# Patient Record
Sex: MALE | Race: BLACK OR AFRICAN AMERICAN | Employment: OTHER | URBAN - METROPOLITAN AREA
[De-identification: names, ages, dates, MRNs, and addresses within clinical notes are randomized per-mention and may not be internally consistent; named-entity substitution may affect disease eponyms.]

---

## 2023-06-07 LAB
ALBUMIN (G/DL) IN SER/PLAS: 4.3 G/DL (ref 3.4–5)
ALBUMIN (MG/L) IN URINE: 51.4 MG/L
ALBUMIN/CREATININE (UG/MG) IN URINE: 40.5 UG/MG CRT (ref 0–30)
ANION GAP IN SER/PLAS: 13 MMOL/L (ref 10–20)
CALCIUM (MG/DL) IN SER/PLAS: 9 MG/DL (ref 8.6–10.6)
CARBON DIOXIDE, TOTAL (MMOL/L) IN SER/PLAS: 28 MMOL/L (ref 21–32)
CHLORIDE (MMOL/L) IN SER/PLAS: 105 MMOL/L (ref 98–107)
CREATININE (MG/DL) IN SER/PLAS: 1.35 MG/DL (ref 0.5–1.3)
CREATININE (MG/DL) IN URINE: 127 MG/DL (ref 20–370)
ESTIMATED AVERAGE GLUCOSE FOR HBA1C: 160 MG/DL
GFR MALE: 55 ML/MIN/1.73M2
GLUCOSE (MG/DL) IN SER/PLAS: 176 MG/DL (ref 74–99)
HEMOGLOBIN A1C/HEMOGLOBIN TOTAL IN BLOOD: 7.2 %
PHOSPHATE (MG/DL) IN SER/PLAS: 4.1 MG/DL (ref 2.5–4.9)
POTASSIUM (MMOL/L) IN SER/PLAS: 4.3 MMOL/L (ref 3.5–5.3)
SODIUM (MMOL/L) IN SER/PLAS: 142 MMOL/L (ref 136–145)
UREA NITROGEN (MG/DL) IN SER/PLAS: 18 MG/DL (ref 6–23)

## 2023-09-17 PROBLEM — H93.13 BILATERAL TINNITUS: Status: ACTIVE | Noted: 2021-10-26

## 2023-09-17 PROBLEM — E11.9 DIABETES (MULTI): Status: ACTIVE | Noted: 2023-09-17

## 2023-09-17 PROBLEM — E78.5 DYSLIPIDEMIA: Status: ACTIVE | Noted: 2023-09-17

## 2023-09-17 PROBLEM — H90.3 SENSORINEURAL HEARING LOSS, BILATERAL: Status: ACTIVE | Noted: 2021-10-26

## 2023-09-17 PROBLEM — I10 HYPERTENSION: Status: ACTIVE | Noted: 2023-09-17

## 2023-09-17 PROBLEM — E11.40 DIABETIC NEUROPATHY (MULTI): Status: ACTIVE | Noted: 2023-09-17

## 2023-09-17 PROBLEM — E55.9 VITAMIN D DEFICIENCY: Status: ACTIVE | Noted: 2023-09-17

## 2023-09-17 RX ORDER — GLIPIZIDE 10 MG/1
10 TABLET, FILM COATED, EXTENDED RELEASE ORAL 2 TIMES DAILY
COMMUNITY
End: 2023-10-10 | Stop reason: ALTCHOICE

## 2023-09-17 RX ORDER — METFORMIN HYDROCHLORIDE 750 MG/1
1 TABLET, EXTENDED RELEASE ORAL
COMMUNITY

## 2023-09-17 RX ORDER — LOSARTAN POTASSIUM 50 MG/1
1 TABLET ORAL DAILY
COMMUNITY
End: 2023-10-10 | Stop reason: ALTCHOICE

## 2023-09-17 RX ORDER — CLOPIDOGREL BISULFATE 75 MG/1
1 TABLET ORAL DAILY
COMMUNITY
End: 2023-10-10 | Stop reason: ALTCHOICE

## 2023-09-17 RX ORDER — ASPIRIN 81 MG/1
1 TABLET ORAL DAILY
COMMUNITY
End: 2023-10-10 | Stop reason: ALTCHOICE

## 2023-09-17 RX ORDER — LOSARTAN POTASSIUM 25 MG/1
1 TABLET ORAL DAILY
COMMUNITY
End: 2023-10-10 | Stop reason: ALTCHOICE

## 2023-09-17 RX ORDER — ATORVASTATIN CALCIUM 20 MG/1
1 TABLET, FILM COATED ORAL NIGHTLY
COMMUNITY

## 2023-09-17 RX ORDER — AMLODIPINE BESYLATE 5 MG/1
TABLET ORAL
COMMUNITY

## 2023-10-10 ENCOUNTER — LAB (OUTPATIENT)
Dept: LAB | Facility: LAB | Age: 73
End: 2023-10-10
Payer: MEDICARE

## 2023-10-10 ENCOUNTER — OFFICE VISIT (OUTPATIENT)
Dept: PRIMARY CARE | Facility: CLINIC | Age: 73
End: 2023-10-10
Payer: MEDICARE

## 2023-10-10 VITALS
SYSTOLIC BLOOD PRESSURE: 140 MMHG | BODY MASS INDEX: 27.17 KG/M2 | WEIGHT: 205.03 LBS | RESPIRATION RATE: 16 BRPM | OXYGEN SATURATION: 97 % | HEIGHT: 73 IN | HEART RATE: 86 BPM | DIASTOLIC BLOOD PRESSURE: 80 MMHG

## 2023-10-10 DIAGNOSIS — Z00.00 HEALTHCARE MAINTENANCE: ICD-10-CM

## 2023-10-10 DIAGNOSIS — R97.20 ELEVATED PSA: ICD-10-CM

## 2023-10-10 DIAGNOSIS — I10 ESSENTIAL HYPERTENSION: ICD-10-CM

## 2023-10-10 DIAGNOSIS — L30.9 ECZEMA, UNSPECIFIED TYPE: ICD-10-CM

## 2023-10-10 DIAGNOSIS — R97.20 ELEVATED PSA: Primary | ICD-10-CM

## 2023-10-10 DIAGNOSIS — E11.9 TYPE 2 DIABETES MELLITUS WITHOUT COMPLICATION, WITHOUT LONG-TERM CURRENT USE OF INSULIN (MULTI): ICD-10-CM

## 2023-10-10 DIAGNOSIS — E78.5 HYPERLIPIDEMIA, UNSPECIFIED HYPERLIPIDEMIA TYPE: ICD-10-CM

## 2023-10-10 PROBLEM — H25.13 NUCLEAR SENILE CATARACT OF BOTH EYES: Status: ACTIVE | Noted: 2019-02-05

## 2023-10-10 PROBLEM — H61.23 BILATERAL IMPACTED CERUMEN: Status: ACTIVE | Noted: 2021-10-26

## 2023-10-10 LAB
ALBUMIN SERPL BCP-MCNC: 4.2 G/DL (ref 3.4–5)
ANION GAP SERPL CALC-SCNC: 15 MMOL/L (ref 10–20)
BUN SERPL-MCNC: 17 MG/DL (ref 6–23)
CALCIUM SERPL-MCNC: 9.6 MG/DL (ref 8.6–10.6)
CHLORIDE SERPL-SCNC: 105 MMOL/L (ref 98–107)
CO2 SERPL-SCNC: 27 MMOL/L (ref 21–32)
CREAT SERPL-MCNC: 1.64 MG/DL (ref 0.5–1.3)
GFR SERPL CREATININE-BSD FRML MDRD: 43 ML/MIN/1.73M*2
GLUCOSE SERPL-MCNC: 179 MG/DL (ref 74–99)
PHOSPHATE SERPL-MCNC: 4.3 MG/DL (ref 2.5–4.9)
POTASSIUM SERPL-SCNC: 4.5 MMOL/L (ref 3.5–5.3)
PSA SERPL-MCNC: 4.4 NG/ML
SODIUM SERPL-SCNC: 142 MMOL/L (ref 136–145)

## 2023-10-10 PROCEDURE — 1159F MED LIST DOCD IN RCRD: CPT | Performed by: INTERNAL MEDICINE

## 2023-10-10 PROCEDURE — 1036F TOBACCO NON-USER: CPT | Performed by: INTERNAL MEDICINE

## 2023-10-10 PROCEDURE — G0008 ADMIN INFLUENZA VIRUS VAC: HCPCS | Performed by: INTERNAL MEDICINE

## 2023-10-10 PROCEDURE — 3079F DIAST BP 80-89 MM HG: CPT | Performed by: INTERNAL MEDICINE

## 2023-10-10 PROCEDURE — 3077F SYST BP >= 140 MM HG: CPT | Performed by: INTERNAL MEDICINE

## 2023-10-10 PROCEDURE — 84153 ASSAY OF PSA TOTAL: CPT

## 2023-10-10 PROCEDURE — 3051F HG A1C>EQUAL 7.0%<8.0%: CPT | Performed by: INTERNAL MEDICINE

## 2023-10-10 PROCEDURE — 1160F RVW MEDS BY RX/DR IN RCRD: CPT | Performed by: INTERNAL MEDICINE

## 2023-10-10 PROCEDURE — 80069 RENAL FUNCTION PANEL: CPT

## 2023-10-10 PROCEDURE — 36415 COLL VENOUS BLD VENIPUNCTURE: CPT

## 2023-10-10 PROCEDURE — 99214 OFFICE O/P EST MOD 30 MIN: CPT | Performed by: INTERNAL MEDICINE

## 2023-10-10 PROCEDURE — 90662 IIV NO PRSV INCREASED AG IM: CPT | Performed by: INTERNAL MEDICINE

## 2023-10-10 RX ORDER — TRIAMCINOLONE ACETONIDE 1 MG/G
CREAM TOPICAL 2 TIMES DAILY
Qty: 15 G | Refills: 0 | Status: SHIPPED | OUTPATIENT
Start: 2023-10-10 | End: 2023-10-12 | Stop reason: SDUPTHER

## 2023-10-10 RX ORDER — LOSARTAN POTASSIUM AND HYDROCHLOROTHIAZIDE 12.5; 5 MG/1; MG/1
1 TABLET ORAL DAILY
COMMUNITY

## 2023-10-10 RX ORDER — DAPAGLIFLOZIN 5 MG/1
5 TABLET, FILM COATED ORAL DAILY
Qty: 30 TABLET | Refills: 11 | Status: SHIPPED | OUTPATIENT
Start: 2023-10-10 | End: 2023-10-12 | Stop reason: SDUPTHER

## 2023-10-10 RX ORDER — GLIPIZIDE 10 MG/1
10 TABLET, FILM COATED, EXTENDED RELEASE ORAL EVERY MORNING
COMMUNITY
End: 2023-10-10 | Stop reason: ALTCHOICE

## 2023-10-10 RX ORDER — GLIPIZIDE 5 MG/1
5 TABLET, FILM COATED, EXTENDED RELEASE ORAL NIGHTLY
COMMUNITY
End: 2023-10-10 | Stop reason: ALTCHOICE

## 2023-10-10 ASSESSMENT — ENCOUNTER SYMPTOMS
DEPRESSION: 0
LOSS OF SENSATION IN FEET: 0
OCCASIONAL FEELINGS OF UNSTEADINESS: 0

## 2023-10-10 NOTE — PATIENT INSTRUCTIONS
It was great seeing you in clinic today!  -We are going to start a new medicine for your diabetes, which will help both with your diabetes and help protect your kidneys. This medicine is called Dapagliflozin (Farxiga). You should STOP taking glipizide when you start this new medicine.  -We have sent a topical steroid cream that you should apply to your eczema on your legs. Make sure you apply vaseline and lotion to your legs especially after you shower.  -Please go to your lab on your way out so we can check labs today.    Return to clinic in 3-4 months for a follow up

## 2023-10-10 NOTE — PROGRESS NOTES
Follow Up Visit     Mr. Pham is a 72 y/o M here for follow up visit with regards to his diabetes and microalbuminuria. We tried to start Jardiance at his last visit but it was not covered.  He also has a pruritic rash on his right leg, no drainage nontender.  No new body products.    Physical Exam  Vitals reviewed.   Constitutional:       Appearance: Normal appearance.   HENT:      Head: Normocephalic and atraumatic.      Nose: Nose normal.      Mouth/Throat:      Mouth: Mucous membranes are moist.      Pharynx: Oropharynx is clear.   Eyes:      Extraocular Movements: Extraocular movements intact.      Conjunctiva/sclera: Conjunctivae normal.   Cardiovascular:      Rate and Rhythm: Normal rate and regular rhythm.      Pulses: Normal pulses.      Heart sounds: Normal heart sounds.   Pulmonary:      Effort: Pulmonary effort is normal.      Breath sounds: Normal breath sounds.   Abdominal:      General: Abdomen is flat.      Palpations: Abdomen is soft.   Musculoskeletal:         General: Normal range of motion.   Skin:     General: Skin is warm and dry.      Comments: Scaly discoid plaques with small papules diffusely across shins. R>L. Diffuse dryness of legs    Neurological:      General: No focal deficit present.      Mental Status: He is alert.   Psychiatric:         Mood and Affect: Mood normal.         Behavior: Behavior normal.         Thought Content: Thought content normal.        Assessment plan:  Mr. Pham is a very pleasant 73-year-old male here for annual wellness and physical.    Diabetes with diabetic neuropathy: A1c 7.2% in June 2023, on glipizide metformin, change glipizide to Farxiga.      Hypertension: On amlodipine, losartan-HCTZ.. Overall has a healthy lifestyle. Check renal function.      CKD stage II: with proteinuria.  Start Farxiga 5 mg.    Dyslipidemia: On atorvastatin 20, LDL 88.      RTC 3- 4 months    Staffed with Dr. Obdulio Marino MD   Internal Medicine-Pediatrics,  PGY4    Attending note:    I reviewed resident's note including history, exam, assessment and plan.  I personally obtained key parts of history and examined the patient,  I agree with resident's assessment and plan as stated above.

## 2023-10-12 DIAGNOSIS — E11.9 TYPE 2 DIABETES MELLITUS WITHOUT COMPLICATION, WITHOUT LONG-TERM CURRENT USE OF INSULIN (MULTI): ICD-10-CM

## 2023-10-12 DIAGNOSIS — L30.9 ECZEMA, UNSPECIFIED TYPE: ICD-10-CM

## 2023-10-13 RX ORDER — DAPAGLIFLOZIN 5 MG/1
5 TABLET, FILM COATED ORAL DAILY
Qty: 30 TABLET | Refills: 11 | Status: SHIPPED | OUTPATIENT
Start: 2023-10-13 | End: 2024-05-16 | Stop reason: SINTOL

## 2023-10-13 RX ORDER — TRIAMCINOLONE ACETONIDE 1 MG/G
CREAM TOPICAL 2 TIMES DAILY
Qty: 15 G | Refills: 0 | Status: SHIPPED | OUTPATIENT
Start: 2023-10-13

## 2023-10-18 ENCOUNTER — APPOINTMENT (OUTPATIENT)
Dept: PRIMARY CARE | Facility: CLINIC | Age: 73
End: 2023-10-18
Payer: MEDICARE

## 2023-11-03 ENCOUNTER — OFFICE VISIT (OUTPATIENT)
Dept: PRIMARY CARE | Facility: CLINIC | Age: 73
End: 2023-11-03
Payer: MEDICARE

## 2023-11-03 VITALS
HEIGHT: 73 IN | OXYGEN SATURATION: 96 % | WEIGHT: 198.19 LBS | RESPIRATION RATE: 16 BRPM | BODY MASS INDEX: 26.27 KG/M2 | DIASTOLIC BLOOD PRESSURE: 76 MMHG | SYSTOLIC BLOOD PRESSURE: 130 MMHG | HEART RATE: 65 BPM

## 2023-11-03 DIAGNOSIS — E78.5 HYPERLIPIDEMIA, UNSPECIFIED HYPERLIPIDEMIA TYPE: ICD-10-CM

## 2023-11-03 DIAGNOSIS — L30.9 ECZEMA, UNSPECIFIED TYPE: Primary | ICD-10-CM

## 2023-11-03 DIAGNOSIS — E11.9 TYPE 2 DIABETES MELLITUS WITHOUT COMPLICATION, WITHOUT LONG-TERM CURRENT USE OF INSULIN (MULTI): ICD-10-CM

## 2023-11-03 PROCEDURE — 3051F HG A1C>EQUAL 7.0%<8.0%: CPT | Performed by: INTERNAL MEDICINE

## 2023-11-03 PROCEDURE — 3078F DIAST BP <80 MM HG: CPT | Performed by: INTERNAL MEDICINE

## 2023-11-03 PROCEDURE — 1160F RVW MEDS BY RX/DR IN RCRD: CPT | Performed by: INTERNAL MEDICINE

## 2023-11-03 PROCEDURE — 99214 OFFICE O/P EST MOD 30 MIN: CPT | Performed by: INTERNAL MEDICINE

## 2023-11-03 PROCEDURE — 1036F TOBACCO NON-USER: CPT | Performed by: INTERNAL MEDICINE

## 2023-11-03 PROCEDURE — 3075F SYST BP GE 130 - 139MM HG: CPT | Performed by: INTERNAL MEDICINE

## 2023-11-03 PROCEDURE — 1159F MED LIST DOCD IN RCRD: CPT | Performed by: INTERNAL MEDICINE

## 2023-11-03 RX ORDER — TRIAMCINOLONE ACETONIDE 1 MG/G
CREAM TOPICAL 2 TIMES DAILY
Qty: 15 G | Refills: 0 | Status: SHIPPED | OUTPATIENT
Start: 2023-11-03 | End: 2023-12-11 | Stop reason: SDUPTHER

## 2023-11-03 NOTE — PROGRESS NOTES
"Follow Up / Rash/     Follow Up Visit     Mr. Pham is a 72 y/o M here for evaluation of rash.  Has had a pruritic rash bilateral legs.  He says he has had this before, it comes and goes.  Has had it on his arms as well but currently only on the legs.  No pain.  No drainage.    In terms of his type 2 diabetes, he did start farxiga but it made him urinate a lot so he stopped taking it.  Otherwise no complaints.    No fevers no chills, no weight changes.    No URI symptoms    No cough no shortness of breath    No chest pain no palpitations    Appetite intact, no abdominal pain.    No new or unusual headaches.        Blood pressure 130/76, pulse 65, resp. rate 16, height 1.854 m (6' 1\"), weight 89.9 kg (198 lb 3.1 oz), SpO2 96 %.  Exam:  Calm coherent well-appearing    Neck is supple    Breathing comfortably no cough    Regular rate and rhythm, no edema    Abdomen soft and nontender    Extremities warm well perfused with no clubbing or cyanosis    On skin examination has diffuse scaly dry skin bilateral lower legs, mild erythema no pallor no induration.  Nontender.       Assessment plan:  Mr. Pham is a very pleasant 73-year-old male here for follow up.     Diabetes with diabetic neuropathy: A1c 7.2% in June 2023, on metformin, did not tolerate Farxiga.  To restart glipizide.      Hypertension: On amlodipine, losartan-HCTZ.. Overall has a healthy lifestyle.     CKD stage II: with proteinuria.  Did not tried SGLT2, significant polyuria.      Dyslipidemia: On atorvastatin 20, LDL 88.         Rah: Consistent with eczema, triamcinolone cream.        Follow-up 3 months  "

## 2023-12-11 DIAGNOSIS — L30.9 ECZEMA, UNSPECIFIED TYPE: ICD-10-CM

## 2023-12-11 RX ORDER — TRIAMCINOLONE ACETONIDE 1 MG/G
CREAM TOPICAL 2 TIMES DAILY
Qty: 15 G | Refills: 11 | Status: SHIPPED | OUTPATIENT
Start: 2023-12-11 | End: 2024-05-16 | Stop reason: SDUPTHER

## 2023-12-13 ENCOUNTER — OFFICE VISIT (OUTPATIENT)
Dept: PRIMARY CARE | Facility: CLINIC | Age: 73
End: 2023-12-13
Payer: MEDICARE

## 2023-12-13 VITALS
DIASTOLIC BLOOD PRESSURE: 70 MMHG | SYSTOLIC BLOOD PRESSURE: 124 MMHG | WEIGHT: 201.72 LBS | RESPIRATION RATE: 16 BRPM | HEART RATE: 65 BPM | BODY MASS INDEX: 26.73 KG/M2 | HEIGHT: 73 IN | OXYGEN SATURATION: 97 %

## 2023-12-13 DIAGNOSIS — E78.5 DYSLIPIDEMIA: Primary | ICD-10-CM

## 2023-12-13 DIAGNOSIS — I10 ESSENTIAL HYPERTENSION: ICD-10-CM

## 2023-12-13 DIAGNOSIS — R21 RASH: ICD-10-CM

## 2023-12-13 DIAGNOSIS — E11.9 TYPE 2 DIABETES MELLITUS WITHOUT COMPLICATION, WITHOUT LONG-TERM CURRENT USE OF INSULIN (MULTI): ICD-10-CM

## 2023-12-13 PROCEDURE — 1036F TOBACCO NON-USER: CPT | Performed by: INTERNAL MEDICINE

## 2023-12-13 PROCEDURE — 1159F MED LIST DOCD IN RCRD: CPT | Performed by: INTERNAL MEDICINE

## 2023-12-13 PROCEDURE — 3051F HG A1C>EQUAL 7.0%<8.0%: CPT | Performed by: INTERNAL MEDICINE

## 2023-12-13 PROCEDURE — 3078F DIAST BP <80 MM HG: CPT | Performed by: INTERNAL MEDICINE

## 2023-12-13 PROCEDURE — 1160F RVW MEDS BY RX/DR IN RCRD: CPT | Performed by: INTERNAL MEDICINE

## 2023-12-13 PROCEDURE — 3074F SYST BP LT 130 MM HG: CPT | Performed by: INTERNAL MEDICINE

## 2023-12-13 PROCEDURE — 99214 OFFICE O/P EST MOD 30 MIN: CPT | Performed by: INTERNAL MEDICINE

## 2023-12-13 NOTE — PROGRESS NOTES
"Office Visit/ Rash      HPI:  Mr. Pham is a 74 y/o M here for follow up visit and persistent skin rash.   Patient was started on triamcinolone for the rash on his legs, and he presents today with no significant improvement. Looks like it progressed to this thighs and lower abdomen and back. It is pruritus, no drainage or signs of infection.  Diffuse dry circular skin lesions with irregular borders, and   scaly. Denies using any new detergent or soap/lotion.       ROS:  No fevers no chills, no weight changes.    No URI symptoms    No cough no shortness of breath    No chest pain no palpitations    Appetite intact, no abdominal pain.    No new or unusual headaches.    Vitals and exam:Blood pressure 124/70, pulse 65, resp. rate 16, height 1.854 m (6' 1\"), weight 91.5 kg (201 lb 11.5 oz), SpO2 97 %.  Calm coherent well-appearing    Neck is supple    Breathing comfortably no cough    Regular rate and rhythm, no edema    Abdomen soft and nontender    Extremities warm well perfused with no clubbing or cyanosis    She has diffuse eczematous scaly rash on his legs arms and back, there is no erythema no pallor.  No drainage.    Assessment plan:  Mr. Pham is pleasant 73-year-old male here for follow up.      Diffuse skin rash: Contact dermatitis vs allergic reaction. Did not resolve with topical steroids. No blisters or scaling, no signs of infection or bleeding.   Referral to dermatology given.     Diabetes with diabetic neuropathy: A1c 7.2% in June 2023, on metformin and glipizide He did not tolerate Farxiga due to frequent urination.          Hypertension: On amlodipine, losartan-HCTZ.. Overall has a healthy lifestyle.      CKD stage II: with proteinuria.  On losartan         Dyslipidemia: On atorvastatin 20.       Pateint was seen and examined with Dr. Mak.     Merle Hewitt  Internal medicine PGY 3          Attending note:    I reviewed resident's note including history, exam, assessment and plan.  I personally " obtained key parts of history and examined the patient,  I agree with resident's assessment and plan as stated above.

## 2024-02-01 ENCOUNTER — OFFICE VISIT (OUTPATIENT)
Dept: PRIMARY CARE | Facility: CLINIC | Age: 74
End: 2024-02-01
Payer: MEDICARE

## 2024-02-01 VITALS
OXYGEN SATURATION: 96 % | WEIGHT: 201 LBS | BODY MASS INDEX: 26.64 KG/M2 | RESPIRATION RATE: 16 BRPM | HEIGHT: 73 IN | SYSTOLIC BLOOD PRESSURE: 120 MMHG | TEMPERATURE: 97.2 F | DIASTOLIC BLOOD PRESSURE: 70 MMHG | HEART RATE: 60 BPM

## 2024-02-01 DIAGNOSIS — E11.43 DIABETIC AUTONOMIC NEUROPATHY ASSOCIATED WITH TYPE 2 DIABETES MELLITUS (MULTI): ICD-10-CM

## 2024-02-01 DIAGNOSIS — N18.31 STAGE 3A CHRONIC KIDNEY DISEASE (MULTI): ICD-10-CM

## 2024-02-01 DIAGNOSIS — H61.23 BILATERAL IMPACTED CERUMEN: Primary | ICD-10-CM

## 2024-02-01 PROCEDURE — 3078F DIAST BP <80 MM HG: CPT | Performed by: INTERNAL MEDICINE

## 2024-02-01 PROCEDURE — 1036F TOBACCO NON-USER: CPT | Performed by: INTERNAL MEDICINE

## 2024-02-01 PROCEDURE — 99214 OFFICE O/P EST MOD 30 MIN: CPT | Performed by: INTERNAL MEDICINE

## 2024-02-01 PROCEDURE — 3074F SYST BP LT 130 MM HG: CPT | Performed by: INTERNAL MEDICINE

## 2024-02-01 ASSESSMENT — PATIENT HEALTH QUESTIONNAIRE - PHQ9
SUM OF ALL RESPONSES TO PHQ9 QUESTIONS 1 AND 2: 0
1. LITTLE INTEREST OR PLEASURE IN DOING THINGS: NOT AT ALL
2. FEELING DOWN, DEPRESSED OR HOPELESS: NOT AT ALL

## 2024-02-01 NOTE — PROGRESS NOTES
"Subjective   Patient ID: Iglesia Pham is a 73 y.o. male who presents for Ear Fullness and Earache (Patient got water in his right ear while showering and is having difficultly hearing.).    HPI patient states for some time has been having decreased hearing in left ear but now he has much worse decreased hearing in the right ear and it seemed to get a lot worse after he got water in his right ear.      Review of Systems  Patient has ringing in both ears chronic  Objective   /70 (BP Location: Right arm, Patient Position: Sitting)   Pulse 60   Temp 36.2 °C (97.2 °F)   Resp 16   Ht 1.854 m (6' 1\")   Wt 91.2 kg (201 lb)   SpO2 96%   BMI 26.52 kg/m²     Physical Exam  Both ears decreased hearing impacted cerumen bilaterally right worse than left    Assessment/Plan   Diagnoses and all orders for this visit:  Bilateral impacted cerumen contributing to his decreased hearing this was removed with lavage wax curette Q-tip with much improvement in his hearing.  He may have decreased hearing more today because of water in his ear which should gradually improve if that does not give us a call and we will do a hearing test in addition  Diabetes with diabetic neuropathy stable followed by primary  Diabetic nephropathy on treatment Farxiga stable       "

## 2024-02-09 ENCOUNTER — OFFICE VISIT (OUTPATIENT)
Dept: PRIMARY CARE | Facility: CLINIC | Age: 74
End: 2024-02-09
Payer: MEDICARE

## 2024-02-09 ENCOUNTER — LAB (OUTPATIENT)
Dept: LAB | Facility: LAB | Age: 74
End: 2024-02-09
Payer: MEDICARE

## 2024-02-09 VITALS
HEIGHT: 73 IN | RESPIRATION RATE: 16 BRPM | SYSTOLIC BLOOD PRESSURE: 136 MMHG | DIASTOLIC BLOOD PRESSURE: 78 MMHG | OXYGEN SATURATION: 98 % | HEART RATE: 63 BPM | WEIGHT: 197.09 LBS | BODY MASS INDEX: 26.12 KG/M2

## 2024-02-09 DIAGNOSIS — R97.20 ELEVATED PSA: ICD-10-CM

## 2024-02-09 DIAGNOSIS — I10 ESSENTIAL HYPERTENSION: ICD-10-CM

## 2024-02-09 DIAGNOSIS — Z12.11 COLON CANCER SCREENING: ICD-10-CM

## 2024-02-09 DIAGNOSIS — E78.5 HYPERLIPIDEMIA, UNSPECIFIED HYPERLIPIDEMIA TYPE: ICD-10-CM

## 2024-02-09 DIAGNOSIS — E11.9 TYPE 2 DIABETES MELLITUS WITHOUT COMPLICATION, WITHOUT LONG-TERM CURRENT USE OF INSULIN (MULTI): Primary | ICD-10-CM

## 2024-02-09 DIAGNOSIS — Z00.00 ENCOUNTER FOR PREVENTIVE HEALTH EXAMINATION: ICD-10-CM

## 2024-02-09 DIAGNOSIS — E11.9 TYPE 2 DIABETES MELLITUS WITHOUT COMPLICATION, WITHOUT LONG-TERM CURRENT USE OF INSULIN (MULTI): ICD-10-CM

## 2024-02-09 LAB
ALBUMIN SERPL BCP-MCNC: 4 G/DL (ref 3.4–5)
ALP SERPL-CCNC: 58 U/L (ref 33–136)
ALT SERPL W P-5'-P-CCNC: 15 U/L (ref 10–52)
ANION GAP SERPL CALC-SCNC: 14 MMOL/L (ref 10–20)
AST SERPL W P-5'-P-CCNC: 16 U/L (ref 9–39)
BILIRUB SERPL-MCNC: 0.9 MG/DL (ref 0–1.2)
BUN SERPL-MCNC: 20 MG/DL (ref 6–23)
CALCIUM SERPL-MCNC: 9.3 MG/DL (ref 8.6–10.6)
CHLORIDE SERPL-SCNC: 102 MMOL/L (ref 98–107)
CHOLEST SERPL-MCNC: 138 MG/DL (ref 0–199)
CHOLESTEROL/HDL RATIO: 3.1
CO2 SERPL-SCNC: 27 MMOL/L (ref 21–32)
CREAT SERPL-MCNC: 1.43 MG/DL (ref 0.5–1.3)
EGFRCR SERPLBLD CKD-EPI 2021: 51 ML/MIN/1.73M*2
ERYTHROCYTE [DISTWIDTH] IN BLOOD BY AUTOMATED COUNT: 12.4 % (ref 11.5–14.5)
EST. AVERAGE GLUCOSE BLD GHB EST-MCNC: 298 MG/DL
GLUCOSE SERPL-MCNC: 269 MG/DL (ref 74–99)
HBA1C MFR BLD: 12 %
HCT VFR BLD AUTO: 42.1 % (ref 41–52)
HDLC SERPL-MCNC: 44.4 MG/DL
HGB BLD-MCNC: 14.5 G/DL (ref 13.5–17.5)
LDLC SERPL CALC-MCNC: 63 MG/DL
MCH RBC QN AUTO: 29.2 PG (ref 26–34)
MCHC RBC AUTO-ENTMCNC: 34.4 G/DL (ref 32–36)
MCV RBC AUTO: 85 FL (ref 80–100)
NON HDL CHOLESTEROL: 94 MG/DL (ref 0–149)
NRBC BLD-RTO: 0 /100 WBCS (ref 0–0)
PLATELET # BLD AUTO: 198 X10*3/UL (ref 150–450)
POTASSIUM SERPL-SCNC: 3.8 MMOL/L (ref 3.5–5.3)
PROT SERPL-MCNC: 7.2 G/DL (ref 6.4–8.2)
PSA SERPL-MCNC: 3.65 NG/ML
RBC # BLD AUTO: 4.97 X10*6/UL (ref 4.5–5.9)
SODIUM SERPL-SCNC: 139 MMOL/L (ref 136–145)
TRIGL SERPL-MCNC: 155 MG/DL (ref 0–149)
VLDL: 31 MG/DL (ref 0–40)
WBC # BLD AUTO: 5.9 X10*3/UL (ref 4.4–11.3)

## 2024-02-09 PROCEDURE — 99397 PER PM REEVAL EST PAT 65+ YR: CPT | Performed by: INTERNAL MEDICINE

## 2024-02-09 PROCEDURE — G0439 PPPS, SUBSEQ VISIT: HCPCS | Performed by: INTERNAL MEDICINE

## 2024-02-09 PROCEDURE — 3078F DIAST BP <80 MM HG: CPT | Performed by: INTERNAL MEDICINE

## 2024-02-09 PROCEDURE — 84153 ASSAY OF PSA TOTAL: CPT

## 2024-02-09 PROCEDURE — 85027 COMPLETE CBC AUTOMATED: CPT

## 2024-02-09 PROCEDURE — 1036F TOBACCO NON-USER: CPT | Performed by: INTERNAL MEDICINE

## 2024-02-09 PROCEDURE — 80061 LIPID PANEL: CPT

## 2024-02-09 PROCEDURE — 3075F SYST BP GE 130 - 139MM HG: CPT | Performed by: INTERNAL MEDICINE

## 2024-02-09 PROCEDURE — 80053 COMPREHEN METABOLIC PANEL: CPT

## 2024-02-09 PROCEDURE — 1159F MED LIST DOCD IN RCRD: CPT | Performed by: INTERNAL MEDICINE

## 2024-02-09 PROCEDURE — 83036 HEMOGLOBIN GLYCOSYLATED A1C: CPT

## 2024-02-09 PROCEDURE — 36415 COLL VENOUS BLD VENIPUNCTURE: CPT

## 2024-02-09 RX ORDER — TAMSULOSIN HYDROCHLORIDE 0.4 MG/1
0.4 CAPSULE ORAL DAILY
Qty: 30 CAPSULE | Refills: 11 | Status: SHIPPED | OUTPATIENT
Start: 2024-02-09 | End: 2025-02-08

## 2024-02-09 NOTE — PROGRESS NOTES
Follow Up Visit     Iglesia is a pleasant 73-year-old male here for annual wellness and physical.  History of type 2 diabetes, hypertension CKD.  Doing well.  Taking all his medications.  Continues to be physically active and independent.  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  Physically active.  Diet is average.    No recent hospitalizations.    All medications reviewed and reconciled by me the provider..  No use of controlled substances or opiates.    Family history, social history reviewed, no changes.    Patient does not smoke.    Patient drinks socially occasionally.  Patient hydrates adequately daily.  Eats a well-balanced healthy diet.     Physically active.  Patient denies any difficulty with memory or cognition.     Denies any difficulty with hearing.  Patient does not wear hearing aids.    No fall risk.  No recent falls.  Denies any difficulty walking.    Patient with no history of depression anxiety, denies any loss of interest, no feeling of sadness, no lack of motivation.    Patient is independent in all ADLs and IADLs.  Independent bathing, dressing, walking.  Takes care of own finances, shopping and cooking.     End-of-life decision-making power of  reviewed with patient.      ROS:  No unintentional weight gain or weight loss, no fevers no chills, no night sweats.  No fatigue.    No congestion runny nose sore throat.  No ear pain.  No tinnitus.  No change in hearing.  No change in vision.    No neck pain.    No cough no shortness of breath.  No wheezing.    No chest pain shortness of breath palpitations with rest or with activity.  No orthopnea no PND.  No edema.    No abdominal pain, no nausea vomiting diarrhea.  No dark stools.  No dysphagia, no anorexia.  Appetite intact.    No heat or cold intolerance, constipation diarrhea, weight changes.  No polyuria polydipsia.      No joint pain besides occasional aches and pains,  No muscle weakness.    No new rash.    No headache, no change in memory, no  "change in cognition.  No weakness numbness tingling of extremities.  No difficulty with gait.    No easy bruisability.    No feeling of sadness, loss of interest, anxiety.    Does report dribbling, nocturia.  No hematuria no dysuria.    Vitals and exam:Blood pressure 136/78, pulse 63, resp. rate 16, height 1.854 m (6' 1\"), weight 89.4 kg (197 lb 1.5 oz), SpO2 98 %.  Calm coherent well-appearing.    Neck is supple with no tenderness, thyroid mobile soft with no nodules, oropharynx clear.  Sinuses nontender.    Breathing comfortably, clear to auscultation bilaterally.    Regular rate and rhythm, no murmur gallops or rubs, good distal pulses.  No edema.  No JVD.  No carotid bruit.    Abdomen is soft, nontender, normal bowel sounds.  No ascites.  No hepatosplenomegaly.    Upper and lower extremity joints intact with full active range of motion.  Strength is 5 out of 5 in upper and lower extremities.    Skin is grossly normal, moist.     Extremity warm, well-perfused, no clubbing or cyanosis.    Coherent, cognition intact, memory intact.  Gait intact.    No cervical, supraclavicular, axillary or inguinal lymphadenopathy.      Assessment plan:  Mr. Pham is pleasant 73-year-old male here for annual wellness physical.     Diabetes with diabetic neuropathy: A1c 7.2% in June 2023, on metformin and glipizide He did not tolerate Farxiga due to frequent urination.  Check A1c today.        Hypertension: On amlodipine, losartan-HCTZ.. Overall has a healthy lifestyle.  Check renal function.     CKD stage II: with proteinuria.  On losartan.  Check renal function.  Knows to avoid NSAIDs.        Dyslipidemia: On atorvastatin 20.  Check lipid panel.    BPH: In past PSA was 11, repeat in November 4, saw urology about a year ago at [facility], at that time he declined any further invasive testing, plan is for serial PSA screening.  Will get another PSA today.  He is also symptomatic, start tamsulosin.     Health maintenance: Again never did " his colonoscopy, I think given lack of motivation to make an appointment Cologuard will be a better option.  He is 73, this will be his last screening.  He does understand that if the test is positive he needs to get a colonoscopy.  Discussed end-of-life decision-making power of .  Immunization up-to-date.  Follow-up with new PCP in 6 months.

## 2024-02-12 ENCOUNTER — APPOINTMENT (OUTPATIENT)
Dept: PRIMARY CARE | Facility: CLINIC | Age: 74
End: 2024-02-12
Payer: MEDICARE

## 2024-02-12 DIAGNOSIS — E11.9 TYPE 2 DIABETES MELLITUS WITHOUT COMPLICATION, WITHOUT LONG-TERM CURRENT USE OF INSULIN (MULTI): Primary | ICD-10-CM

## 2024-02-26 ENCOUNTER — OFFICE VISIT (OUTPATIENT)
Dept: PRIMARY CARE | Facility: CLINIC | Age: 74
End: 2024-02-26
Payer: MEDICARE

## 2024-02-26 VITALS — HEART RATE: 80 BPM | RESPIRATION RATE: 16 BRPM

## 2024-02-26 DIAGNOSIS — H61.23 BILATERAL IMPACTED CERUMEN: Primary | ICD-10-CM

## 2024-02-26 PROCEDURE — 99214 OFFICE O/P EST MOD 30 MIN: CPT | Performed by: INTERNAL MEDICINE

## 2024-02-26 PROCEDURE — 1159F MED LIST DOCD IN RCRD: CPT | Performed by: INTERNAL MEDICINE

## 2024-02-26 PROCEDURE — 1036F TOBACCO NON-USER: CPT | Performed by: INTERNAL MEDICINE

## 2024-02-26 NOTE — PROGRESS NOTES
Office Visit /Right  Ear Hearing Loss      Follow Up Visit     Iglesia is a pleasant 73-year-old here for evaluation of cerumen impaction.  Has had decreased hearing in both ears, has used drops in the ears and using Q-tips to try to drain it.  Noticed small amount of blood this morning.  Right ear.  He was very scant amount of blood on the Q-tip but not any drainage.  Has not had any recurrence of symptoms since this morning.  Denies any tinnitus, no headache no neck pain no URI symptoms.  No chest pain shortness of breath.  No cough.  Appetite intact.  Otherwise he is doing well.  I just saw him recently for his physical.        Pulse 80, resp. rate 16.  Blood pressure on exam is 130/70.  Calm coherent well-appearing    Neck is supple    Breathing comfortably no cough    Regular rate and rhythm, no edema    Abdomen soft and nontender    Extremities warm well perfused with no clubbing or cyanosis    Has significant bilateral cerumen impaction, nontender, I did try to use water irrigation to drain some of this but too deep and too much.  No pain, no bleeding.  No visible blood on exam.    Assessment plan:  Mr. Pham is pleasant 73-year-old male here for bilateral cerumen impaction, I was not able to drain this using water irrigation, referred to ENT.  Discouraged him from using Q-tips.    Diabetes with diabetic neuropathy: I just saw him recently, significantly elevated A1c of 12, we discussed some dietary changes, reducing snacking, just added Ozempic which she has not started yet.  Call with any GI symptoms.  No history of thyroid cancer or pancreatic issues.      Hypertension: On amlodipine, losartan-HCTZ.. Overall has a healthy lifestyle.  Check renal function.     CKD stage II: with proteinuria.  On losartan and Farxiga..       Dyslipidemia: On atorvastatin 20.      BPH: In past PSA was 11, repeat in November 4, saw urology about a year ago at [facility], at that time he declined any further invasive testing, we  repeated PSA 2 weeks ago, 3.65.  Annual monitoring.    Health maintenance: Pending Cologuard.  Has not done colonoscopy in past, he does understand that if the test is positive he needs to get a colonoscopy.      I personally irrigated the patient's ears, greater than 25 minutes spent with patient including counseling, interaction and documentation.

## 2024-03-04 LAB — NONINV COLON CA DNA+OCC BLD SCRN STL QL: NEGATIVE

## 2024-03-05 ENCOUNTER — TELEPHONE (OUTPATIENT)
Dept: PRIMARY CARE | Facility: CLINIC | Age: 74
End: 2024-03-05
Payer: MEDICARE

## 2024-04-02 ENCOUNTER — OFFICE VISIT (OUTPATIENT)
Dept: OTOLARYNGOLOGY | Facility: CLINIC | Age: 74
End: 2024-04-02
Payer: MEDICARE

## 2024-04-02 VITALS — WEIGHT: 197 LBS | BODY MASS INDEX: 25.99 KG/M2

## 2024-04-02 DIAGNOSIS — H61.23 BILATERAL IMPACTED CERUMEN: ICD-10-CM

## 2024-04-02 PROCEDURE — 69210 REMOVE IMPACTED EAR WAX UNI: CPT | Performed by: GENERAL PRACTICE

## 2024-04-02 PROCEDURE — 1159F MED LIST DOCD IN RCRD: CPT | Performed by: GENERAL PRACTICE

## 2024-04-02 PROCEDURE — 99214 OFFICE O/P EST MOD 30 MIN: CPT | Performed by: GENERAL PRACTICE

## 2024-04-02 ASSESSMENT — PATIENT HEALTH QUESTIONNAIRE - PHQ9
1. LITTLE INTEREST OR PLEASURE IN DOING THINGS: NOT AT ALL
2. FEELING DOWN, DEPRESSED OR HOPELESS: NOT AT ALL
SUM OF ALL RESPONSES TO PHQ9 QUESTIONS 1 AND 2: 0

## 2024-04-03 NOTE — PROGRESS NOTES
Otolaryngology - Head and Neck Surgery Outpatient New Patient Visit Note        Assessment/Plan   Problem List Items Addressed This Visit             ICD-10-CM       ENT    Bilateral impacted cerumen H61.23       B/l cerumen impaction, toelrated debridement well.        Follow up:  -plan for follow up in clinic as needed    All of the above findings, impressions, treatment planning and follow up plans were discussed with the patient who indicated understanding.  the patient was instructed to contact or return to clinic sooner if symptoms/signs persist or worsen despite the above management.      Phil Carroll MD  Otolaryngology - Head and Neck Surgery            History Of Present Illness  Iglesia Pham is a 74 y.o. male presenting for muffled hearing      The patient reports a history of ear wax buildup requiring debridement, usually every 6-12mo.    The pt reports gradual return of ear canal fullness and plugged sensation.  Reports some muffled hearing.    Denies otalgia, otorrhea.    Denies recent significant history of otitis media or externa.    Denies prior significant history of otologic surgery or trauma.             Past Medical History  He has a past medical history of Calculus of ureter (09/03/2013) and Ureteric stone (09/03/2013).    Surgical History  He has no past surgical history on file.     Social History  He reports that he has never smoked. He has never used smokeless tobacco. He reports current alcohol use of about 2.0 standard drinks of alcohol per week. He reports that he does not use drugs.    Family History  Family History   Problem Relation Name Age of Onset    Hypertension Mother      Diabetes Father          Allergies  Lisinopril    Review of Systems  ROS: Pertinent positives as noted in HPI.    - CONSTITUTIONAL: Does not report weight loss, fever or chills.    - HEENT:   Ear: Does not report  , vertigo,    , otalgia, otorrhea  Nose: Does not report congestion, rhinorrhea, epistaxis,  decreased smell  Throat: Does not report pain, dysphagia, odynophagia  Larynx: Does not report hoarseness,  difficulty breathing, pain with speaking (odynophonia)  Neck: Does not report new masses, pain, swelling  Face: Does not report sinus pain, pressure, swelling, numbness, weakness     - RESPIRATORY: Does not report SOB or cough.    - CV: Does not report palpitations or chest pain.     - GI: Does not report abdominal pain, nausea, vomiting or diarrhea.    - : Does not report dysuria or urinary frequency.    - MSK: Does not report myalgia or joint pain.    - SKIN: Does not report rash or pruritus.    - NEUROLOGICAL: Does not report headache or syncope.    - PSYCHIATRIC: Does not report recent changes in mood. Does not report anxiety or depression.         Physical Exam:     GENERAL:   Alert & Oriented to person, place and time; Normal affect and appearance. Well developed and well nourished. Conversant & cooperative with examination.     HEAD:   Normocephalic, atraumatic. No sinus tenderness to palpation. Normal parotid bilaterally. Normal facial strength.     NEUROLOGIC:   Cranial nerves II-XII grossly intact, gait WNL. Normal mood and affect.    EYES:   Extraocular movements intact. Pupils equal, round, reactive to light and accommodation. No nystagmus, no ptosis. no scleral injection.    EAR:   Normal auricle. No discomfort or TTP with manipulation.   Handheld otoscopic exam showed normal external auditory canals bilaterally. No purulence or EAC inflammation. Severe impacted cerumen b/l remvoed with suction. Extra time needed  Right tympanic membrane clear and mobile without evidence of perforation, retraction or middle ear effusion.   Left tympanic membrane clear and mobile without evidence of perforation, retraction or middle ear effusion.     NOSE:   No external deformity. No external nasal lesions, lacerations, or scars. Nasal tip symmetrical with normal nasal valves.   Nasal cavity with essentially  midline septum, normal mucosa and turbinates. No lesions, masses, purulence or polyps.     OC/OP:   Mucous membranes moist, no masses, lesions or exudates.   Normal tongue, floor of mouth, teeth, gums, lips. Normal posterior pharyngeal wall.    Normal tonsils without erythema, exudate or obvious calculi     NECK:   No neck masses or thyroid enlargement. Trachea midline. No tenderness to palpation    LYMPHATIC:   No cervical lymphadenopathy.     RESPIRATORY:   Symmetric chest elevation & no retractions. No significant hoarseness. No increased work of breathing.    CV:   No clubbing or cyanosis. No obvious edema    Skin:   No facial rashes, vesicles or lesions.     Extremities:   No gross abnormalities      Clinic Procedure    Binocular microscopy exam  Indication: tympanic membrane(s) could not be visualized adequately with handheld otoscopy.   Location:  bilateral ears  Visualization Instrument: A microscope was used to visualize through a speculum placed in the ear canal(s) to visualize the ear canal, tympanic membranes and to assist in assessment and removal of debris.  Findings:  see physical exam documentation  Patient Status: The patient tolerated the procedure well.   Complications: There were no complications.     Information review:  External sources (notes, imaging, lab results) listed below personally reviewed to aid in medical decision making process.  -  -  -

## 2024-05-16 ENCOUNTER — OFFICE VISIT (OUTPATIENT)
Dept: PRIMARY CARE | Facility: HOSPITAL | Age: 74
End: 2024-05-16
Payer: MEDICARE

## 2024-05-16 ENCOUNTER — LAB (OUTPATIENT)
Dept: LAB | Facility: LAB | Age: 74
End: 2024-05-16
Payer: MEDICARE

## 2024-05-16 VITALS
HEIGHT: 73 IN | SYSTOLIC BLOOD PRESSURE: 124 MMHG | HEART RATE: 65 BPM | BODY MASS INDEX: 25.71 KG/M2 | WEIGHT: 194 LBS | DIASTOLIC BLOOD PRESSURE: 64 MMHG

## 2024-05-16 DIAGNOSIS — N18.31 STAGE 3A CHRONIC KIDNEY DISEASE (MULTI): ICD-10-CM

## 2024-05-16 DIAGNOSIS — I10 ESSENTIAL HYPERTENSION: ICD-10-CM

## 2024-05-16 DIAGNOSIS — E78.5 DYSLIPIDEMIA: ICD-10-CM

## 2024-05-16 DIAGNOSIS — E11.9 TYPE 2 DIABETES MELLITUS WITHOUT COMPLICATION, WITHOUT LONG-TERM CURRENT USE OF INSULIN (MULTI): Primary | ICD-10-CM

## 2024-05-16 DIAGNOSIS — E11.9 TYPE 2 DIABETES MELLITUS WITHOUT COMPLICATION, WITHOUT LONG-TERM CURRENT USE OF INSULIN (MULTI): ICD-10-CM

## 2024-05-16 LAB
25(OH)D3 SERPL-MCNC: 41 NG/ML (ref 30–100)
ALBUMIN SERPL BCP-MCNC: 4.7 G/DL (ref 3.4–5)
ALP SERPL-CCNC: 51 U/L (ref 33–136)
ALT SERPL W P-5'-P-CCNC: 15 U/L (ref 10–52)
ANION GAP SERPL CALC-SCNC: 13 MMOL/L (ref 10–20)
AST SERPL W P-5'-P-CCNC: 15 U/L (ref 9–39)
BILIRUB SERPL-MCNC: 1 MG/DL (ref 0–1.2)
BUN SERPL-MCNC: 19 MG/DL (ref 6–23)
CALCIUM SERPL-MCNC: 9.2 MG/DL (ref 8.6–10.3)
CHLORIDE SERPL-SCNC: 101 MMOL/L (ref 98–107)
CO2 SERPL-SCNC: 26 MMOL/L (ref 21–32)
CREAT SERPL-MCNC: 1.25 MG/DL (ref 0.5–1.3)
EGFRCR SERPLBLD CKD-EPI 2021: 60 ML/MIN/1.73M*2
EST. AVERAGE GLUCOSE BLD GHB EST-MCNC: 315 MG/DL
GLUCOSE SERPL-MCNC: 289 MG/DL (ref 74–99)
HBA1C MFR BLD: 12.6 %
POTASSIUM SERPL-SCNC: 4.1 MMOL/L (ref 3.5–5.3)
PROT SERPL-MCNC: 7.2 G/DL (ref 6.4–8.2)
SODIUM SERPL-SCNC: 136 MMOL/L (ref 136–145)

## 2024-05-16 PROCEDURE — 99215 OFFICE O/P EST HI 40 MIN: CPT | Performed by: INTERNAL MEDICINE

## 2024-05-16 PROCEDURE — 36415 COLL VENOUS BLD VENIPUNCTURE: CPT

## 2024-05-16 PROCEDURE — 3078F DIAST BP <80 MM HG: CPT | Performed by: INTERNAL MEDICINE

## 2024-05-16 PROCEDURE — 1159F MED LIST DOCD IN RCRD: CPT | Performed by: INTERNAL MEDICINE

## 2024-05-16 PROCEDURE — 82306 VITAMIN D 25 HYDROXY: CPT

## 2024-05-16 PROCEDURE — 1160F RVW MEDS BY RX/DR IN RCRD: CPT | Performed by: INTERNAL MEDICINE

## 2024-05-16 PROCEDURE — 82570 ASSAY OF URINE CREATININE: CPT

## 2024-05-16 PROCEDURE — 82043 UR ALBUMIN QUANTITATIVE: CPT

## 2024-05-16 PROCEDURE — 83036 HEMOGLOBIN GLYCOSYLATED A1C: CPT

## 2024-05-16 PROCEDURE — 80053 COMPREHEN METABOLIC PANEL: CPT

## 2024-05-16 PROCEDURE — 3074F SYST BP LT 130 MM HG: CPT | Performed by: INTERNAL MEDICINE

## 2024-05-16 RX ORDER — VIT C/E/ZN/COPPR/LUTEIN/ZEAXAN 250MG-90MG
25 CAPSULE ORAL DAILY
COMMUNITY

## 2024-05-16 NOTE — PATIENT INSTRUCTIONS
Blood work today    Schedule eye exam - [phone]    Apply 2-3 drops of mineral oil to each ear and then place a small cotton ball. Do this for 3d before you see Dr. Carroll -[phone]

## 2024-05-16 NOTE — PROGRESS NOTES
Chief Complaint   Patient presents with    Diabetes         History Of Present Illness:    Iglesia Pham is a 74 y.o. male presenting to establish care, update health maintenance and address hearing loss in right ear.  Iglesia has hx of DM, HTN, HLD and enlarged prostate.  Last PCP left practice and needs new PCP and updated labs.  Recent ear wax removal by ENT, but now hearing muffled in both ears. ?Reaccumulation of ear wax.   Last A1C 12.0.  Was on farxiga but stopped due to frequent urination.  No other side effects. Has BPH and elevated PSA.  Bx on 2/11/22 and no PCa.    Continues to have frequent urination and nocturia 2-3x per night   Eye exam due - no sudden vision changes  Some nails are thickened and pitted.  This is chronic.        Active Medical Problems:  Patient Active Problem List    Diagnosis Date Noted    Stage 3a chronic kidney disease (Multi) 02/01/2024    Diabetes (Multi) 09/17/2023    Dyslipidemia 09/17/2023    Diabetic neuropathy (Multi) 09/17/2023    Vitamin D deficiency 09/17/2023    Bilateral tinnitus 10/26/2021    Sensorineural hearing loss, bilateral 10/26/2021    Bilateral impacted cerumen 10/26/2021    Nuclear senile cataract of both eyes 02/05/2019    Erectile dysfunction of organic origin 09/08/2015    Elevated PSA 07/23/2014    Essential hypertension 09/03/2013       Past Medical History:  Past Medical History:   Diagnosis Date    BPH (benign prostatic hyperplasia)     PVR 43, 2022    Calculus of ureter 09/03/2013    Diabetes mellitus (Multi)     Elevated PSA     Bx on 2/11/22 - no PCa    Hyperlipidemia     Kidney stones     one episode, removed by cysto    Ureteric stone 09/03/2013       Past Surgical History:  Past Surgical History:   Procedure Laterality Date    CYSTOSCOPY      stone removal         Social History:  Social History     Tobacco Use    Smoking status: Never    Smokeless tobacco: Never   Vaping Use    Vaping status: Never Used   Substance Use Topics    Alcohol use: Yes  "    Alcohol/week: 2.0 standard drinks of alcohol     Types: 1 Cans of beer, 1 Shots of liquor per week     Comment: occasionally    Drug use: Never         Family History:  Family History   Problem Relation Name Age of Onset    Hypertension Mother      Diabetes Father      Cancer Sister 11 passed     No Known Problems Daughter 2     No Known Problems Son 6     No Known Problems Grandchild 15         [place], [place]        Allergies:  Farxiga [dapagliflozin] and Lisinopril    Outpatient Medications:    Current Outpatient Medications:     amLODIPine (Norvasc) 5 mg tablet, , Disp: , Rfl:     atorvastatin (Lipitor) 20 mg tablet, Take 1 tablet (20 mg) by mouth once daily at bedtime., Disp: , Rfl:     losartan-hydrochlorothiazide (Hyzaar) 50-12.5 mg tablet, Take 1 tablet by mouth once daily., Disp: , Rfl:     metFORMIN XR (Glucophage-XR) 750 mg 24 hr tablet, Take 1 tablet (750 mg) by mouth once daily in the evening. Take with meals., Disp: , Rfl:     tamsulosin (Flomax) 0.4 mg 24 hr capsule, Take 1 capsule (0.4 mg) by mouth once daily., Disp: 30 capsule, Rfl: 11    triamcinolone (Kenalog) 0.1 % cream, Apply topically 2 times a day. Apply to affected area 1-2 times daily as needed., Disp: 15 g, Rfl: 0    cholecalciferol (Vitamin D3) 25 MCG (1000 UT) capsule, Take 1 capsule (25 mcg) by mouth once daily., Disp: , Rfl:     semaglutide 0.25 mg or 0.5 mg (2 mg/3 mL) pen injector, Inject 0.25 mg under the skin 1 (one) time per week., Disp: 3 mL, Rfl: 0    Review of Systems:  Pertinent positives in review of systems outlined above.  Complete ROS otherwise negative.           Last Recorded Vitals:  Vitals:    05/16/24 1052 05/16/24 1147   BP: 145/76 124/64   Pulse: 65    Weight: 88 kg (194 lb)    Height: 1.854 m (6' 1\")    Body mass index is 25.6 kg/m².        Physical Exam  HENT:      Right Ear: There is impacted cerumen.      Left Ear: There is impacted cerumen.      Mouth/Throat:      Pharynx: Oropharynx is clear. "   Eyes:      Extraocular Movements: Extraocular movements intact.      Conjunctiva/sclera: Conjunctivae normal.      Pupils: Pupils are equal, round, and reactive to light.   Neck:      Vascular: No carotid bruit.   Cardiovascular:      Rate and Rhythm: Normal rate and regular rhythm.      Pulses: Normal pulses.      Heart sounds: Normal heart sounds.   Pulmonary:      Effort: Pulmonary effort is normal.      Breath sounds: Normal breath sounds.   Abdominal:      General: Abdomen is flat. Bowel sounds are normal.      Palpations: Abdomen is soft.   Musculoskeletal:      Right lower leg: No edema.      Left lower leg: No edema.      Comments: 2+ DP bilateral.  No skin breakdown.     Lymphadenopathy:      Cervical: No cervical adenopathy.        Assessment/Plan   Problem List Items Addressed This Visit       Diabetes (Multi)     A1C and microalb due now.  Urinary frequency more likely from hyperglycemia and less likely from farxiga. Will discuss restarting farxiga OR starting insulin if next A1C is over 10.          Relevant Orders    Comprehensive Metabolic Panel (Completed)    Hemoglobin A1C (Completed)    Albumin , Urine Random (Completed)    Dyslipidemia     Fasting lipids due now.  Continue atorvastatin         Relevant Orders    Comprehensive Metabolic Panel (Completed)    Essential hypertension     BP in a good range.  Continue current meds         Relevant Orders    Comprehensive Metabolic Panel (Completed)    Stage 3a chronic kidney disease (Multi) - Primary     Check CMP, CBC, UA.  Continue ARB.  To consider adding SGLT2i         Relevant Orders    Comprehensive Metabolic Panel (Completed)    Vitamin D 25-Hydroxy,Total (for eval of Vitamin D levels) (Completed)         A total of 60 minutes was spent reviewing the chart and recent testing and discussing plan of care.     Wilfrido Sánchez MD

## 2024-05-17 LAB
CREAT UR-MCNC: 73 MG/DL (ref 20–370)
MICROALBUMIN UR-MCNC: 41.8 MG/L
MICROALBUMIN/CREAT UR: 57.3 UG/MG CREAT

## 2024-05-18 ENCOUNTER — TELEPHONE (OUTPATIENT)
Dept: PRIMARY CARE | Facility: CLINIC | Age: 74
End: 2024-05-18
Payer: MEDICARE

## 2024-05-18 DIAGNOSIS — E11.9 TYPE 2 DIABETES MELLITUS WITHOUT COMPLICATION, WITHOUT LONG-TERM CURRENT USE OF INSULIN (MULTI): Primary | ICD-10-CM

## 2024-05-18 RX ORDER — BLOOD-GLUCOSE,RECEIVER,CONT
EACH MISCELLANEOUS
Qty: 1 EACH | Refills: 3 | Status: SHIPPED | OUTPATIENT
Start: 2024-05-18

## 2024-05-18 RX ORDER — BLOOD-GLUCOSE SENSOR
EACH MISCELLANEOUS
Qty: 4 EACH | Refills: 3 | Status: SHIPPED | OUTPATIENT
Start: 2024-05-18

## 2024-05-18 RX ORDER — INSULIN GLARGINE 100 [IU]/ML
20 INJECTION, SOLUTION SUBCUTANEOUS NIGHTLY
Qty: 6 ML | Refills: 3 | Status: SHIPPED | OUTPATIENT
Start: 2024-05-18

## 2024-05-18 NOTE — ASSESSMENT & PLAN NOTE
A1C and microalb due now.  Urinary frequency more likely from hyperglycemia and less likely from farxiga. Will discuss restarting farxiga OR starting insulin if next A1C is over 10.

## 2024-05-18 NOTE — TELEPHONE ENCOUNTER
Iglesia's blood sugar is very high, and I am recommending to start insulin.  I sent an order for CGM and lantus to his pharmacy.  I want him to come into the office to learn how to set it up and to review dose of insulin.  Pls call him on Monday

## 2024-05-20 NOTE — TELEPHONE ENCOUNTER
Spoke with Patient we went over his lab  results. He knows he needs to start insulin. He will  his prescription   and check with his wife to come in and go over instruction for  insulin and glucose meter.  He will call me back to come in this week.

## 2024-05-31 ENCOUNTER — TELEPHONE (OUTPATIENT)
Dept: PRIMARY CARE | Facility: CLINIC | Age: 74
End: 2024-05-31
Payer: MEDICARE

## 2024-06-16 DIAGNOSIS — I10 HYPERTENSION, UNSPECIFIED TYPE: ICD-10-CM

## 2024-06-17 ENCOUNTER — OFFICE VISIT (OUTPATIENT)
Dept: PRIMARY CARE | Facility: HOSPITAL | Age: 74
End: 2024-06-17
Payer: MEDICARE

## 2024-06-17 VITALS
HEART RATE: 71 BPM | WEIGHT: 191.3 LBS | DIASTOLIC BLOOD PRESSURE: 83 MMHG | BODY MASS INDEX: 25.24 KG/M2 | SYSTOLIC BLOOD PRESSURE: 148 MMHG | OXYGEN SATURATION: 99 % | TEMPERATURE: 97.4 F

## 2024-06-17 DIAGNOSIS — I10 HYPERTENSION, UNSPECIFIED TYPE: ICD-10-CM

## 2024-06-17 DIAGNOSIS — E78.5 DYSLIPIDEMIA: Primary | ICD-10-CM

## 2024-06-17 PROCEDURE — 99214 OFFICE O/P EST MOD 30 MIN: CPT | Performed by: INTERNAL MEDICINE

## 2024-06-17 RX ORDER — ATORVASTATIN CALCIUM 20 MG/1
20 TABLET, FILM COATED ORAL NIGHTLY
Qty: 30 TABLET | Refills: 5 | Status: SHIPPED | OUTPATIENT
Start: 2024-06-17

## 2024-06-17 RX ORDER — AMLODIPINE BESYLATE 5 MG/1
5 TABLET ORAL DAILY
Qty: 30 TABLET | Refills: 5 | Status: SHIPPED | OUTPATIENT
Start: 2024-06-17

## 2024-06-17 RX ORDER — LOSARTAN POTASSIUM AND HYDROCHLOROTHIAZIDE 12.5; 5 MG/1; MG/1
1 TABLET ORAL DAILY
Qty: 30 TABLET | Refills: 5 | Status: SHIPPED | OUTPATIENT
Start: 2024-06-17

## 2024-06-17 RX ORDER — AMLODIPINE BESYLATE 5 MG/1
5 TABLET ORAL DAILY
Qty: 30 TABLET | Refills: 0 | Status: SHIPPED | OUTPATIENT
Start: 2024-06-17 | End: 2024-06-17 | Stop reason: SDUPTHER

## 2024-06-17 NOTE — PROGRESS NOTES
Chief Complaint:   Follow-up ( Talked about medication)     History Of Present Illness:    Iglesia Pham is a 74 y.o. male with active medical problems outlined below who presents for follow-up of diabetes.  Iglesia was seen as a new patient on 5/16/2024.  At that time he reported increased thirst and frequent urination.  His primary care doctor prescribed Ozempic on 2/26/2024, but he stopped the medication because he thought it was causing him to urinate more frequently.  His hemoglobin A1c was 12.0 on 2/9/2024, fasting blood sugar 289.  I recommend to start a continuous glucose monitor and to begin a single dose of long-acting Lantus insulin once daily.  Iglesia picked up his glucose monitor, but he never started the insulin.  He has been cutting back sugar in his diet, and he believes that he can control his diabetes without having to take insulin.         Patient Active Problem List   Diagnosis    Bilateral tinnitus    Sensorineural hearing loss, bilateral    Diabetes (Multi)    Dyslipidemia    Hypertension    Diabetic neuropathy (Multi)    Vitamin D deficiency    Bilateral impacted cerumen    Elevated PSA    Erectile dysfunction of organic origin    Nuclear senile cataract of both eyes    Stage 3a chronic kidney disease (Multi)       Current Outpatient Medications:     metFORMIN XR (Glucophage-XR) 750 mg 24 hr tablet, Take 1 tablet (750 mg) by mouth once daily in the evening. Take with meals., Disp: , Rfl:     amLODIPine (Norvasc) 5 mg tablet, Take 1 tablet (5 mg) by mouth once daily. as directed, Disp: 30 tablet, Rfl: 5    atorvastatin (Lipitor) 20 mg tablet, Take 1 tablet (20 mg) by mouth once daily at bedtime., Disp: 30 tablet, Rfl: 5    cholecalciferol (Vitamin D3) 25 MCG (1000 UT) capsule, Take 1 capsule (25 mcg) by mouth once daily., Disp: , Rfl:     FreeStyle Aaron 3 [place] misc, Use as instructed, Disp: 1 each, Rfl: 3    FreeStyle Aaron 3 Sensor device, Apply new sensor every 14 days, Disp: 4 each, Rfl:  3    insulin glargine (Lantus Solostar U-100 Insulin) 100 unit/mL (3 mL) pen, Inject 20 Units under the skin once daily at bedtime., Disp: 6 mL, Rfl: 3    losartan-hydrochlorothiazide (Hyzaar) 50-12.5 mg tablet, Take 1 tablet by mouth once daily., Disp: 30 tablet, Rfl: 5    semaglutide 0.25 mg or 0.5 mg (2 mg/3 mL) pen injector, Inject 0.25 mg under the skin 1 (one) time per week., Disp: 3 mL, Rfl: 0    tamsulosin (Flomax) 0.4 mg 24 hr capsule, Take 1 capsule (0.4 mg) by mouth once daily., Disp: 30 capsule, Rfl: 11    triamcinolone (Kenalog) 0.1 % cream, Apply topically 2 times a day. Apply to affected area 1-2 times daily as needed., Disp: 15 g, Rfl: 0  Farxiga [dapagliflozin] and Lisinopril  Social History     Tobacco Use    Smoking status: Never    Smokeless tobacco: Never   Vaping Use    Vaping status: Never Used   Substance Use Topics    Alcohol use: Yes     Alcohol/week: 2.0 standard drinks of alcohol     Types: 1 Cans of beer, 1 Shots of liquor per week     Comment: occasionally    Drug use: Never         All pertinent aspects of medical and surgical history were reviewed and updated in chart    Review of Systems   Pertinent positives in review of systems outlined above.  Complete ROS otherwise negative.        Last Recorded Vitals:  Patient Vitals for the past 24 hrs:   BP Temp Pulse SpO2 Weight   06/17/24 1534 148/83 36.3 °C (97.4 °F) 71 99 % 86.8 kg (191 lb 4.8 oz)          Physical Exam  HENT:      Mouth/Throat:      Pharynx: Oropharynx is clear.   Eyes:      Extraocular Movements: Extraocular movements intact.      Conjunctiva/sclera: Conjunctivae normal.      Pupils: Pupils are equal, round, and reactive to light.   Cardiovascular:      Rate and Rhythm: Normal rate and regular rhythm.      Pulses: Normal pulses.      Heart sounds: Normal heart sounds.             Assessment/Plan   Problem List Items Addressed This Visit       Dyslipidemia - Primary     Lipids at target as of 2/9/2024.  Continue  atorvastatin at current dose.         Relevant Medications    atorvastatin (Lipitor) 20 mg tablet    Hypertension     Blood pressure is elevated, however Iglesia is very anxious about the visit.  Blood pressure will be repeated again on Wednesday.         Relevant Medications    amLODIPine (Norvasc) 5 mg tablet    losartan-hydrochlorothiazide (Hyzaar) 50-12.5 mg tablet       A total of 30 minutes was spent reviewing the chart and recent testing and discussing plan of care.         Wlifrido Sánchez MD

## 2024-06-18 NOTE — ASSESSMENT & PLAN NOTE
Blood pressure is elevated, however Iglesia is very anxious about the visit.  Blood pressure will be repeated again on Wednesday.

## 2024-06-18 NOTE — ASSESSMENT & PLAN NOTE
Iglesia has experienced increased thirst and frequent urination over the past 4 to 6 months.  He attributed his symptoms to his medication and stopped taking Ozempic.  We discussed that his symptoms are related to very high blood sugar, and frequent urination will improve when his blood sugars are lower.  We discussed the 2 consecutive hemoglobin A1c readings greater than 12 are an indication to start insulin.  He understands and is willing to start the medication.  He brought his wife's medication with him today by mistake and will come back to the office on Wednesday for education on how to use the insulin pen and how to apply a continuous glucose monitor sensor on his arm.

## 2024-07-03 ENCOUNTER — TELEPHONE (OUTPATIENT)
Dept: PRIMARY CARE | Facility: CLINIC | Age: 74
End: 2024-07-03
Payer: MEDICARE

## 2024-07-03 NOTE — TELEPHONE ENCOUNTER
Patient came in today to  replace his Aaron Senor My self and Jazmyne Farrell MA, II  helped Mr. Pham today. Jazmyne allowed Mr. Pham to place the senor today as we watched him. We discussed his sugar dropping in the evening  below 60 into the 50. He was taking 20 units every evening. We talked to Dr. Sánchez and he changed his insulin dosage to 10 units daily in the Am. Mr Pham knows he if starts to drop in numbers to call the office . Mr. Pham has a follow up appointment on 07/09.

## 2024-07-05 NOTE — TELEPHONE ENCOUNTER
Patient called ella to let her know that his blood sugar went up to 300 and then took his medicine and it went back down he forgot to take his medication he didn't tell her what it went back down to nor do she know what medication he took patient need a call back

## 2024-07-05 NOTE — TELEPHONE ENCOUNTER
I called and spoke with Iglesia - blood sugar now at 271.  Has been up to 310    Large fries and fish sandwich and large sweet tea this morning.  Has not taken insulin yet.  Discussed diet modification and need to push fluids.  To take usual dose of 10 units of Lantus.  If above 250 tomorrow am, he will increase to 14 units of lantus.  To keep OV with me next Tuesday.

## 2024-07-08 DIAGNOSIS — E11.9 TYPE 2 DIABETES MELLITUS WITHOUT COMPLICATION, WITHOUT LONG-TERM CURRENT USE OF INSULIN (MULTI): Primary | ICD-10-CM

## 2024-07-08 RX ORDER — METFORMIN HYDROCHLORIDE 750 MG/1
750 TABLET, EXTENDED RELEASE ORAL
Qty: 30 TABLET | Refills: 3 | Status: SHIPPED | OUTPATIENT
Start: 2024-07-08 | End: 2024-07-09 | Stop reason: SDUPTHER

## 2024-07-09 ENCOUNTER — OFFICE VISIT (OUTPATIENT)
Dept: PRIMARY CARE | Facility: HOSPITAL | Age: 74
End: 2024-07-09
Payer: MEDICARE

## 2024-07-09 VITALS
WEIGHT: 192.1 LBS | BODY MASS INDEX: 25.34 KG/M2 | OXYGEN SATURATION: 97 % | SYSTOLIC BLOOD PRESSURE: 138 MMHG | TEMPERATURE: 98 F | DIASTOLIC BLOOD PRESSURE: 70 MMHG | HEART RATE: 62 BPM

## 2024-07-09 DIAGNOSIS — R97.20 ELEVATED PSA: Primary | ICD-10-CM

## 2024-07-09 DIAGNOSIS — I10 HYPERTENSION, UNSPECIFIED TYPE: ICD-10-CM

## 2024-07-09 DIAGNOSIS — E11.9 TYPE 2 DIABETES MELLITUS WITHOUT COMPLICATION, WITHOUT LONG-TERM CURRENT USE OF INSULIN (MULTI): Primary | ICD-10-CM

## 2024-07-09 DIAGNOSIS — E11.9 TYPE 2 DIABETES MELLITUS WITHOUT COMPLICATION, WITHOUT LONG-TERM CURRENT USE OF INSULIN (MULTI): ICD-10-CM

## 2024-07-09 DIAGNOSIS — E78.5 DYSLIPIDEMIA: ICD-10-CM

## 2024-07-09 PROCEDURE — 3078F DIAST BP <80 MM HG: CPT | Performed by: INTERNAL MEDICINE

## 2024-07-09 PROCEDURE — 99214 OFFICE O/P EST MOD 30 MIN: CPT | Performed by: INTERNAL MEDICINE

## 2024-07-09 PROCEDURE — 3075F SYST BP GE 130 - 139MM HG: CPT | Performed by: INTERNAL MEDICINE

## 2024-07-09 PROCEDURE — 1159F MED LIST DOCD IN RCRD: CPT | Performed by: INTERNAL MEDICINE

## 2024-07-09 RX ORDER — TAMSULOSIN HYDROCHLORIDE 0.4 MG/1
0.4 CAPSULE ORAL DAILY
Qty: 30 CAPSULE | Refills: 11 | Status: SHIPPED | OUTPATIENT
Start: 2024-07-09 | End: 2024-07-11 | Stop reason: SDUPTHER

## 2024-07-09 RX ORDER — METFORMIN HYDROCHLORIDE 750 MG/1
750 TABLET, EXTENDED RELEASE ORAL
Qty: 30 TABLET | Refills: 5 | Status: SHIPPED | OUTPATIENT
Start: 2024-07-09 | End: 2024-07-10 | Stop reason: SDUPTHER

## 2024-07-09 NOTE — PATIENT INSTRUCTIONS
Increase dose of Lantus to 14 units daily - phone in blood sugar readings in one week    Stay on a regular eating schedule     -Eat healthy protein - nuts, seeds (flax seed, antwon seed, pumpkin seeds), whole grains (brown rice, barley, oatmeal, farro,quinoa), beans, peas (split pea, black eyed pea), lentils  -Eat healthy fats - flax seed, avocado, fish (salmon)  -Eat healthy carbohydrates - whole grains (oats, oatmeal, barley, brown rice, quinoa, farro) - starchy vegetables (sweet potato, beans),     [employer] - [employer]     Web Sites for Recipes:  Blue Zones  R[employer] Over Knives  Plant Strong   Nutritionfacts.org     Cookbooks:  The Get Healthy, Go Vegan Cookbook: 125 Easy and Delicious Recipes to Jump-Start Weight Loss and Help you Feel Great - Dr. Gurpreet Naranjo's Cookbook for Reversing Diabetes:  150 Recipes Scientifically Proven to Reverse Diabetes Without Drugs - Dr. Gurpreet Naranjo  The Prevent and Reverse Heart Disease Cookbook - Dr. Marito Sibley   The Center Study All-Star Collection:  Whole Food, Plant-Based Recipes from Your Favorite Vegan  - Dr. CAMI Sorensen  How Not To Die - Dr. Sherif Onofre

## 2024-07-09 NOTE — PROGRESS NOTES
Chief Complaint:   Follow-up (Sugars)     History Of Present Illness:    Iglesia Pham is a 74 y.o. male with active medical problems outlined below who presents for follow-up of diabetes.    Initial visit 5/16/24  Iglesia has hx of DM, HTN, HLD and enlarged prostate.  Last PCP left practice and needs new PCP and updated labs.  Recent ear wax removal by ENT, but now hearing muffled in both ears. ?Reaccumulation of ear wax.   Last A1C 12.0.  Was on farxiga but stopped due to frequent urination.  No other side effects. Has BPH and elevated PSA.  Bx on 2/11/22 and no PCa.    Continues to have frequent urination and nocturia 2-3x per night   Eye exam due - no sudden vision changes  Some nails are thickened and pitted.  This is chronic.      INTERVAL HISTORY 7/9/24  A1C 12.6 on 5/16/2024.  A continuous glucose monitor was prescribed, and Iglesia was recommended to start Lantus 20 units daily.  He had some concerns about starting insulin and only started the medication over the past couple of weeks.  He was taking his Lantus in the evening and experiencing hypoglycemic episodes with a low blood sugar reading down to 56.  His dose was recently reduced to 10 units, and he was recommended to take it in the morning.  He has not experienced any further hypoglycemia.    His blood sugars continue to run high, but he is struggling to get his diet correct.    He continues to experience frequent urination and some blurred vision.  Is not having chest pain or feeling out of breath with his day-to-day activities.       Patient Active Problem List   Diagnosis    Bilateral tinnitus    Sensorineural hearing loss, bilateral    Diabetes (Multi)    Dyslipidemia    Hypertension    Diabetic neuropathy (Multi)    Vitamin D deficiency    Bilateral impacted cerumen    Elevated PSA    Erectile dysfunction of organic origin    Nuclear senile cataract of both eyes    Stage 3a chronic kidney disease (Multi)       Current Outpatient Medications:      amLODIPine (Norvasc) 5 mg tablet, Take 1 tablet (5 mg) by mouth once daily. as directed, Disp: 30 tablet, Rfl: 5    atorvastatin (Lipitor) 20 mg tablet, Take 1 tablet (20 mg) by mouth once daily at bedtime., Disp: 30 tablet, Rfl: 5    cholecalciferol (Vitamin D3) 25 MCG (1000 UT) capsule, Take 1 capsule (25 mcg) by mouth once daily., Disp: , Rfl:     FreeStyle Aaron 3 [place] misc, Use as instructed, Disp: 1 each, Rfl: 3    FreeStyle Aaron 3 Sensor device, Apply new sensor every 14 days, Disp: 4 each, Rfl: 3    insulin glargine (Lantus Solostar U-100 Insulin) 100 unit/mL (3 mL) pen, Inject 20 Units under the skin once daily at bedtime., Disp: 6 mL, Rfl: 3    losartan-hydrochlorothiazide (Hyzaar) 50-12.5 mg tablet, Take 1 tablet by mouth once daily., Disp: 30 tablet, Rfl: 5    semaglutide 0.25 mg or 0.5 mg (2 mg/3 mL) pen injector, Inject 0.25 mg under the skin 1 (one) time per week., Disp: 3 mL, Rfl: 0    triamcinolone (Kenalog) 0.1 % cream, Apply topically 2 times a day. Apply to affected area 1-2 times daily as needed., Disp: 15 g, Rfl: 0    metFORMIN XR (Glucophage-XR) 750 mg 24 hr tablet, Take 1 tablet (750 mg) by mouth once daily in the evening. Take with meals., Disp: 30 tablet, Rfl: 5    tamsulosin (Flomax) 0.4 mg 24 hr capsule, Take 1 capsule (0.4 mg) by mouth once daily., Disp: 30 capsule, Rfl: 11  Farxiga [dapagliflozin] and Lisinopril  Social History     Tobacco Use    Smoking status: Never    Smokeless tobacco: Never   Vaping Use    Vaping status: Never Used   Substance Use Topics    Alcohol use: Yes     Alcohol/week: 2.0 standard drinks of alcohol     Types: 1 Cans of beer, 1 Shots of liquor per week     Comment: occasionally    Drug use: Never         All pertinent aspects of medical and surgical history were reviewed and updated in chart    Review of Systems   Pertinent positives in review of systems outlined above.  Complete ROS otherwise negative.        Last Recorded Vitals:  No data found.          Physical Exam  HENT:      Mouth/Throat:      Pharynx: Oropharynx is clear.   Eyes:      Extraocular Movements: Extraocular movements intact.      Conjunctiva/sclera: Conjunctivae normal.      Pupils: Pupils are equal, round, and reactive to light.   Cardiovascular:      Rate and Rhythm: Normal rate and regular rhythm.      Pulses: Normal pulses.      Heart sounds: Normal heart sounds.   Pulmonary:      Effort: Pulmonary effort is normal.      Breath sounds: Normal breath sounds.   Musculoskeletal:      Right lower leg: No edema.      Left lower leg: No edema.   Neurological:      General: No focal deficit present.            Assessment/Plan   Problem List Items Addressed This Visit       Diabetes (Multi) - Primary     Made a long discussion around the importance of improving diet to control blood sugar.  We discussed the difference between carbohydrate fat and protein in the importance of eating healthier sources of carbohydrates, healthy fats and plant-based sources of protein.  I provided Iglesia with printed materials to help him with diet, and I referred him to the diabetes educator.  He feels confident that he understands simple carbohydrates and will begin to limit those in his diet.  He will avoid adding sugar or eating foods with added sugar.  He will increase his dose of Lantus to 14 units daily, and he will check in with his blood sugar recordings in 1 week.  We discussed the importance of eating similar amounts at regular intervals to keep his blood sugar study.         Relevant Orders    Referral to Diabetes Education    Comprehensive Metabolic Panel    Hemoglobin A1C    Albumin-Creatinine Ratio, Urine Random    Dyslipidemia     Continue atorvastatin 20 mg daily.  Fasting lipid profile will be included with blood work in 1 month.         Relevant Orders    Comprehensive Metabolic Panel    Hypertension     Blood pressure under good control.  Continue current medications.         Relevant Orders     Comprehensive Metabolic Panel       A total of 30 minutes was spent reviewing the chart and recent testing and discussing plan of care.         Wilfrido Sánchez MD

## 2024-07-10 DIAGNOSIS — E11.9 TYPE 2 DIABETES MELLITUS WITHOUT COMPLICATION, WITHOUT LONG-TERM CURRENT USE OF INSULIN (MULTI): ICD-10-CM

## 2024-07-10 RX ORDER — METFORMIN HYDROCHLORIDE 750 MG/1
750 TABLET, EXTENDED RELEASE ORAL
Qty: 30 TABLET | Refills: 5 | Status: SHIPPED | OUTPATIENT
Start: 2024-07-10

## 2024-07-11 DIAGNOSIS — R97.20 ELEVATED PSA: ICD-10-CM

## 2024-07-11 RX ORDER — TAMSULOSIN HYDROCHLORIDE 0.4 MG/1
0.4 CAPSULE ORAL DAILY
Qty: 30 CAPSULE | Refills: 11 | Status: SHIPPED | OUTPATIENT
Start: 2024-07-11 | End: 2025-07-11

## 2024-07-11 NOTE — ASSESSMENT & PLAN NOTE
Continue atorvastatin 20 mg daily.  Fasting lipid profile will be included with blood work in 1 month.

## 2024-07-11 NOTE — ASSESSMENT & PLAN NOTE
Made a long discussion around the importance of improving diet to control blood sugar.  We discussed the difference between carbohydrate fat and protein in the importance of eating healthier sources of carbohydrates, healthy fats and plant-based sources of protein.  I provided Iglesia with printed materials to help him with diet, and I referred him to the diabetes educator.  He feels confident that he understands simple carbohydrates and will begin to limit those in his diet.  He will avoid adding sugar or eating foods with added sugar.  He will increase his dose of Lantus to 14 units daily, and he will check in with his blood sugar recordings in 1 week.  We discussed the importance of eating similar amounts at regular intervals to keep his blood sugar study.

## 2024-07-16 DIAGNOSIS — R97.20 ELEVATED PSA: ICD-10-CM

## 2024-07-16 RX ORDER — TAMSULOSIN HYDROCHLORIDE 0.4 MG/1
0.4 CAPSULE ORAL DAILY
Qty: 30 CAPSULE | Refills: 11 | Status: SHIPPED | OUTPATIENT
Start: 2024-07-16 | End: 2025-07-16

## 2024-07-16 RX ORDER — TAMSULOSIN HYDROCHLORIDE 0.4 MG/1
0.4 CAPSULE ORAL DAILY
Qty: 30 CAPSULE | Refills: 11 | Status: CANCELLED | OUTPATIENT
Start: 2024-07-16 | End: 2025-07-16

## 2024-08-07 DIAGNOSIS — E11.9 TYPE 2 DIABETES MELLITUS WITHOUT COMPLICATION, WITHOUT LONG-TERM CURRENT USE OF INSULIN (MULTI): ICD-10-CM

## 2024-08-07 RX ORDER — INSULIN GLARGINE 100 [IU]/ML
20 INJECTION, SOLUTION SUBCUTANEOUS NIGHTLY
Qty: 6 ML | Refills: 3 | Status: SHIPPED | OUTPATIENT
Start: 2024-08-07

## 2024-08-20 ENCOUNTER — APPOINTMENT (OUTPATIENT)
Dept: PRIMARY CARE | Facility: HOSPITAL | Age: 74
End: 2024-08-20
Payer: MEDICARE

## 2024-08-28 ENCOUNTER — APPOINTMENT (OUTPATIENT)
Dept: PRIMARY CARE | Facility: CLINIC | Age: 74
End: 2024-08-28
Payer: MEDICARE

## 2024-08-30 ENCOUNTER — LAB (OUTPATIENT)
Dept: LAB | Facility: LAB | Age: 74
End: 2024-08-30
Payer: MEDICARE

## 2024-08-30 ENCOUNTER — TELEPHONE (OUTPATIENT)
Dept: PRIMARY CARE | Facility: CLINIC | Age: 74
End: 2024-08-30

## 2024-08-30 ENCOUNTER — OFFICE VISIT (OUTPATIENT)
Dept: PRIMARY CARE | Facility: CLINIC | Age: 74
End: 2024-08-30
Payer: MEDICARE

## 2024-08-30 VITALS
WEIGHT: 196.4 LBS | SYSTOLIC BLOOD PRESSURE: 134 MMHG | DIASTOLIC BLOOD PRESSURE: 72 MMHG | TEMPERATURE: 98 F | HEART RATE: 59 BPM | BODY MASS INDEX: 25.91 KG/M2 | OXYGEN SATURATION: 98 %

## 2024-08-30 DIAGNOSIS — R97.20 ELEVATED PSA: ICD-10-CM

## 2024-08-30 DIAGNOSIS — E11.9 TYPE 2 DIABETES MELLITUS WITHOUT COMPLICATION, WITH LONG-TERM CURRENT USE OF INSULIN (MULTI): ICD-10-CM

## 2024-08-30 DIAGNOSIS — Z79.4 TYPE 2 DIABETES MELLITUS WITHOUT COMPLICATION, WITH LONG-TERM CURRENT USE OF INSULIN (MULTI): ICD-10-CM

## 2024-08-30 DIAGNOSIS — N40.0 BENIGN PROSTATIC HYPERPLASIA, UNSPECIFIED WHETHER LOWER URINARY TRACT SYMPTOMS PRESENT: ICD-10-CM

## 2024-08-30 DIAGNOSIS — E78.5 DYSLIPIDEMIA: ICD-10-CM

## 2024-08-30 DIAGNOSIS — I10 HYPERTENSION, UNSPECIFIED TYPE: Primary | ICD-10-CM

## 2024-08-30 DIAGNOSIS — N18.31 STAGE 3A CHRONIC KIDNEY DISEASE (MULTI): ICD-10-CM

## 2024-08-30 DIAGNOSIS — E11.9 TYPE 2 DIABETES MELLITUS WITHOUT COMPLICATION, WITHOUT LONG-TERM CURRENT USE OF INSULIN (MULTI): ICD-10-CM

## 2024-08-30 DIAGNOSIS — I10 HYPERTENSION, UNSPECIFIED TYPE: ICD-10-CM

## 2024-08-30 LAB
ALBUMIN SERPL BCP-MCNC: 4.1 G/DL (ref 3.4–5)
ALP SERPL-CCNC: 39 U/L (ref 33–136)
ALT SERPL W P-5'-P-CCNC: 13 U/L (ref 10–52)
ANION GAP SERPL CALC-SCNC: 13 MMOL/L (ref 10–20)
AST SERPL W P-5'-P-CCNC: 16 U/L (ref 9–39)
BILIRUB SERPL-MCNC: 0.9 MG/DL (ref 0–1.2)
BUN SERPL-MCNC: 19 MG/DL (ref 6–23)
CALCIUM SERPL-MCNC: 8.7 MG/DL (ref 8.6–10.3)
CHLORIDE SERPL-SCNC: 107 MMOL/L (ref 98–107)
CO2 SERPL-SCNC: 25 MMOL/L (ref 21–32)
CREAT SERPL-MCNC: 1.35 MG/DL (ref 0.5–1.3)
EGFRCR SERPLBLD CKD-EPI 2021: 54 ML/MIN/1.73M*2
EST. AVERAGE GLUCOSE BLD GHB EST-MCNC: 157 MG/DL
GLUCOSE SERPL-MCNC: 123 MG/DL (ref 74–99)
HBA1C MFR BLD: 7.1 %
POTASSIUM SERPL-SCNC: 3.9 MMOL/L (ref 3.5–5.3)
PROT SERPL-MCNC: 6.5 G/DL (ref 6.4–8.2)
SODIUM SERPL-SCNC: 141 MMOL/L (ref 136–145)

## 2024-08-30 PROCEDURE — 80053 COMPREHEN METABOLIC PANEL: CPT

## 2024-08-30 PROCEDURE — 83036 HEMOGLOBIN GLYCOSYLATED A1C: CPT

## 2024-08-30 PROCEDURE — 36415 COLL VENOUS BLD VENIPUNCTURE: CPT

## 2024-08-30 PROCEDURE — 1159F MED LIST DOCD IN RCRD: CPT | Performed by: INTERNAL MEDICINE

## 2024-08-30 PROCEDURE — 1036F TOBACCO NON-USER: CPT | Performed by: INTERNAL MEDICINE

## 2024-08-30 PROCEDURE — 3075F SYST BP GE 130 - 139MM HG: CPT | Performed by: INTERNAL MEDICINE

## 2024-08-30 PROCEDURE — 99214 OFFICE O/P EST MOD 30 MIN: CPT | Performed by: INTERNAL MEDICINE

## 2024-08-30 PROCEDURE — 3078F DIAST BP <80 MM HG: CPT | Performed by: INTERNAL MEDICINE

## 2024-08-30 NOTE — ASSESSMENT & PLAN NOTE
Blood sugars are improving, and Iglesia has not had a low blood sugar over the past month.  A comprehensive metabolic panel, hemoglobin A1c and urine microalbumin will be assessed this morning.  His eye exam is up-to-date.    Discussed strategies for improving his blood sugar which include increasing intake of fruits and vegetables.  We reviewed a list of healthy fruits and veg, and Iglesia was provided a list of things which he likes or thinks that he may like.  I also encouraged him to try making lentils.  If he likes them he can use them as a substitute for potatoes and rice on his dinner plate.

## 2024-08-30 NOTE — PROGRESS NOTES
Chief Complaint:   Follow-up (? Sugar dropped)     History Of Present Illness:    Iglesia Pham is a 74 y.o. male with active medical problems outlined below who presents for diabetes.      Initial visit 5/16/24  Iglesia has hx of DM, HTN, HLD and enlarged prostate.  Last PCP left practice and needs new PCP and updated labs.  Recent ear wax removal by ENT, but now hearing muffled in both ears. ?Reaccumulation of ear wax.   Last A1C 12.0.  Was on farxiga but stopped due to frequent urination.  No other side effects. Has BPH and elevated PSA.  Bx on 2/11/22 and no PCa.    Continues to have frequent urination and nocturia 2-3x per night   Eye exam due - no sudden vision changes  Some nails are thickened and pitted.  This is chronic.        INTERVAL HISTORY 7/9/24  A1C 12.6 on 5/16/2024.  A continuous glucose monitor was prescribed, and Iglesia was recommended to start Lantus 20 units daily.  He had some concerns about starting insulin and only started the medication over the past couple of weeks.  He was taking his Lantus in the evening and experiencing hypoglycemic episodes with a low blood sugar reading down to 56.  His dose was recently reduced to 10 units, and he was recommended to take it in the morning.  He has not experienced any further hypoglycemia.    His blood sugars continue to run high, but he is struggling to get his diet correct.    He continues to experience frequent urination and some blurred vision.  Is not having chest pain or feeling out of breath with his day-to-day activities.      INTERVAL HISTORY 8/29/24  Iglesia has a history of poorly controlled diabetes.  His hemoglobin A1c was 12.6 on 5/16/2024.  He started a continuous glucose monitor and Lantus insulin at that time.  His blood sugar fluctuated significantly at the start of treatment, however they have been steady her over the past few weeks.  He had a low blood sugar of 50 sometime in July, but his blood sugar has not been less than 90 since  then.  His blood sugar generally stays in the low 100s.  He will occasionally go up to 250 depending on what he eats.  Overall he is feeling well, and his mood and energy level are good.  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  He denies chest pain or shortness of breath with physical exertion.  He denies any change in bowel or bladder habits.         Patient Active Problem List   Diagnosis    Bilateral tinnitus    Sensorineural hearing loss, bilateral    Diabetes (Multi)    Dyslipidemia    Hypertension    Diabetic neuropathy (Multi)    Vitamin D deficiency    Bilateral impacted cerumen    Elevated PSA    Erectile dysfunction of organic origin    Nuclear senile cataract of both eyes    Stage 3a chronic kidney disease (Multi)       Current Outpatient Medications:     amLODIPine (Norvasc) 5 mg tablet, Take 1 tablet (5 mg) by mouth once daily. as directed, Disp: 30 tablet, Rfl: 5    atorvastatin (Lipitor) 20 mg tablet, Take 1 tablet (20 mg) by mouth once daily at bedtime., Disp: 30 tablet, Rfl: 5    cholecalciferol (Vitamin D3) 25 MCG (1000 UT) capsule, Take 1 capsule (25 mcg) by mouth once daily., Disp: , Rfl:     FreeStyle Aaron 3 [place] misc, Use as instructed, Disp: 1 each, Rfl: 3    FreeStyle Aaron 3 Sensor device, Apply new sensor every 14 days, Disp: 4 each, Rfl: 3    insulin glargine (Lantus Solostar U-100 Insulin) 100 unit/mL (3 mL) pen, Inject 20 Units under the skin once daily at bedtime., Disp: 6 mL, Rfl: 3    losartan-hydrochlorothiazide (Hyzaar) 50-12.5 mg tablet, Take 1 tablet by mouth once daily., Disp: 30 tablet, Rfl: 5    metFORMIN XR (Glucophage-XR) 750 mg 24 hr tablet, Take 1 tablet (750 mg) by mouth once daily in the evening. Take with meals., Disp: 30 tablet, Rfl: 5    semaglutide 0.25 mg or 0.5 mg (2 mg/3 mL) pen injector, Inject 0.25 mg under the skin 1 (one) time per week., Disp: 3 mL, Rfl: 0    tamsulosin (Flomax) 0.4 mg 24 hr  capsule, Take 1 capsule (0.4 mg) by mouth once daily., Disp: 30 capsule, Rfl: 11    triamcinolone (Kenalog) 0.1 % cream, Apply topically 2 times a day. Apply to affected area 1-2 times daily as needed., Disp: 15 g, Rfl: 0  Farxiga [dapagliflozin] and Lisinopril  Social History     Tobacco Use    Smoking status: Never    Smokeless tobacco: Never   Vaping Use    Vaping status: Never Used   Substance Use Topics    Alcohol use: Yes     Alcohol/week: 2.0 standard drinks of alcohol     Types: 1 Cans of beer, 1 Shots of liquor per week     Comment: occasionally    Drug use: Never         All pertinent aspects of medical and surgical history were reviewed and updated in chart    Review of Systems   Pertinent positives in review of systems outlined above.  Complete ROS otherwise negative.        Last Recorded Vitals:  Patient Vitals for the past 24 hrs:   BP Temp Pulse SpO2 Weight   08/30/24 1119 134/72 -- -- -- --   08/30/24 1035 164/88 36.7 °C (98 °F) 59 98 % 89.1 kg (196 lb 6.4 oz)          Physical Exam  HENT:      Mouth/Throat:      Pharynx: Oropharynx is clear.   Eyes:      Extraocular Movements: Extraocular movements intact.      Conjunctiva/sclera: Conjunctivae normal.      Pupils: Pupils are equal, round, and reactive to light.   Cardiovascular:      Rate and Rhythm: Normal rate and regular rhythm.      Pulses: Normal pulses.      Heart sounds: Normal heart sounds.   Pulmonary:      Effort: Pulmonary effort is normal.   Musculoskeletal:      Right lower leg: No edema.      Left lower leg: No edema.            Assessment/Plan   Problem List Items Addressed This Visit       Diabetes (Multi)     Blood sugars are improving, and Iglesia has not had a low blood sugar over the past month.  A comprehensive metabolic panel, hemoglobin A1c and urine microalbumin will be assessed this morning.  His eye exam is up-to-date.    Discussed strategies for improving his blood sugar which include increasing intake of fruits and  vegetables.  We reviewed a list of healthy fruits and veg, and Iglesia was provided a list of things which he likes or thinks that he may like.  I also encouraged him to try making lentils.  If he likes them he can use them as a substitute for potatoes and rice on his dinner plate.         Hypertension - Primary     Blood pressure is under good control.  Continue current medications         Stage 3a chronic kidney disease (Multi)     Comprehensive metabolic panel will be assessed now            A total of 30 minutes was spent reviewing the chart and recent testing and discussing plan of care.         Wilfrido Sánchez MD

## 2024-08-30 NOTE — PATIENT INSTRUCTIONS
Blood work today    Swiss Chard, Rosy Greens, Bok [place], Broccoli,  Brussel sprouts, sweet potato, pumpkin , carrots, Lentils (Brand - [employer])    Try roasting vegetables - 400 degree for 20-30 min (brush with olive oil)

## 2024-08-31 NOTE — TELEPHONE ENCOUNTER
Please inform Iglesia that his blood sugar is much better.  A1C is down to 7.1.  That is excellent.  No changes to his diabetes medications at this time.  Blood work should be repeated again in 3mo.  There is a new lab order in the  system.     thanks

## 2024-10-07 ENCOUNTER — APPOINTMENT (OUTPATIENT)
Dept: OTOLARYNGOLOGY | Facility: CLINIC | Age: 74
End: 2024-10-07
Payer: MEDICARE

## 2024-10-07 VITALS — BODY MASS INDEX: 25.07 KG/M2 | WEIGHT: 190 LBS

## 2024-10-07 DIAGNOSIS — H60.63 CHRONIC OTITIS EXTERNA OF BOTH EARS, UNSPECIFIED TYPE: ICD-10-CM

## 2024-10-07 DIAGNOSIS — H61.23 EXCESSIVE CERUMEN IN BOTH EAR CANALS: Primary | ICD-10-CM

## 2024-10-07 PROCEDURE — 1159F MED LIST DOCD IN RCRD: CPT | Performed by: GENERAL PRACTICE

## 2024-10-07 PROCEDURE — 99213 OFFICE O/P EST LOW 20 MIN: CPT | Performed by: GENERAL PRACTICE

## 2024-10-07 PROCEDURE — 69210 REMOVE IMPACTED EAR WAX UNI: CPT | Performed by: GENERAL PRACTICE

## 2024-10-07 RX ORDER — OFLOXACIN 3 MG/ML
2-3 SOLUTION AURICULAR (OTIC) 2 TIMES DAILY
Qty: 5 ML | Refills: 0 | Status: SHIPPED | OUTPATIENT
Start: 2024-10-07 | End: 2024-10-14

## 2024-10-07 RX ORDER — FLUOCINOLONE ACETONIDE 0.11 MG/ML
5 OIL AURICULAR (OTIC) 2 TIMES DAILY
Qty: 20 ML | Refills: 0 | Status: SHIPPED | OUTPATIENT
Start: 2024-10-07

## 2024-10-08 NOTE — PROGRESS NOTES
Otolaryngology - Head and Neck Surgery Outpatient New Patient Visit Note        Assessment/Plan:   Problem List Items Addressed This Visit    None  Visit Diagnoses         Codes    Excessive cerumen in both ear canals    -  Primary H61.23    Chronic otitis externa of both ears, unspecified type     H60.63    Relevant Medications    ofloxacin (Floxin) 0.3 % otic solution    fluocinolone (DermOtic) 0.01 % ear drops            74yoM with cerumen impaction with underlying OE R>L.  Debrided.  Will treat with ofloxin.  Baseline itching and dry irritation suggestive of chronic eczematous OE, will treat with dermotic for long term management after resolution of acute OE.       Follow up:  -plan for follow up in clinic as needed    All of the above findings, impressions, treatment planning and follow up plans were discussed with the patient who indicated understanding.  the patient was instructed to contact or return to clinic sooner if symptoms/signs persist or worsen despite the above management.      Phil Carroll MD  Otolaryngology - Head and Neck Surgery            History Of Present Illness  Iglesia Pham is a 74 y.o. male presenting for evaluation of cerumen.       The patient reports a history of ear wax buildup requiring debridement   The pt reports gradual return of ear canal fullness and plugged sensation.  Reports some muffled hearing.   Reports recent increase in R>L discomfort and irritation.  Notes some baseline itching and dry change to EACs.    Denies  , otorrhea.    Denies recent significant history of otitis media or externa.    Denies prior significant history of otologic surgery or trauma.             Past Medical History  He has a past medical history of BPH (benign prostatic hyperplasia), Calculus of ureter (09/03/2013), Diabetes mellitus (Multi), Elevated PSA, Hyperlipidemia, Kidney stones, and Ureteric stone (09/03/2013).    Surgical History  He has a past surgical history that includes Cystoscopy.      Social History  He reports that he has never smoked. He has never used smokeless tobacco. He reports current alcohol use of about 2.0 standard drinks of alcohol per week. He reports that he does not use drugs.    Family History  Family History   Problem Relation Name Age of Onset    Hypertension Mother      Diabetes Father      Cancer Sister 11 passed     No Known Problems Daughter 2     No Known Problems Son 6     No Known Problems Grandchild 15         [facility], [place], [place]        Allergies  Farxiga [dapagliflozin] and Lisinopril    Review of Systems  ROS: Pertinent positives as noted in HPI.    - CONSTITUTIONAL: Does not report weight loss, fever or chills.    - HEENT:   Ear: Does not report  , vertigo,    ,  , otorrhea  Nose: Does not report congestion, rhinorrhea, epistaxis, decreased smell  Throat: Does not report pain, dysphagia, odynophagia  Larynx: Does not report hoarseness,  difficulty breathing, pain with speaking (odynophonia)  Neck: Does not report new masses, pain, swelling  Face: Does not report sinus pain, pressure, swelling, numbness, weakness     - RESPIRATORY: Does not report SOB or cough.    - CV: Does not report palpitations or chest pain.     - GI: Does not report abdominal pain, nausea, vomiting or diarrhea.    - : Does not report dysuria or urinary frequency.    - MSK: Does not report myalgia or joint pain.    - SKIN: Does not report rash or pruritus.    - NEUROLOGICAL: Does not report headache or syncope.    - PSYCHIATRIC: Does not report recent changes in mood. Does not report anxiety or depression.         Physical Exam:     GENERAL:   Alert & Oriented to person, place and time; Normal affect and appearance. Well developed and well nourished. Conversant & cooperative with examination.     HEAD:   Normocephalic, atraumatic. No sinus tenderness to palpation. Normal parotid bilaterally. Normal facial strength.     NEUROLOGIC:   Cranial nerves II-XII grossly intact, gait WNL. Normal mood  and affect.    EYES:   Extraocular movements intact. Pupils equal, round, reactive to light and accommodation. No nystagmus, no ptosis. no scleral injection.    EAR:   Normal auricle. No discomfort or TTP with manipulation.   Handheld otoscopic exam showed normal external auditory canals bilaterally. No purulence or EAC edema.  Dry irritation and erythema b/l to lateral EACs.  R>L cerumen impaction with underlying wet mucoid debris and squamous debris.   Right tympanic membrane clear and mobile without evidence of perforation, retraction or middle ear effusion.   Left tympanic membrane clear and mobile without evidence of perforation, retraction or middle ear effusion.     NOSE:   No external deformity. No external nasal lesions, lacerations, or scars. Nasal tip symmetrical with normal nasal valves.   Nasal cavity with essentially midline septum, normal mucosa and turbinates. No lesions, masses, purulence or polyps.     OC/OP:   Mucous membranes moist, no masses, lesions or exudates.   Normal tongue, floor of mouth, teeth, gums, lips. Normal posterior pharyngeal wall.    Normal tonsils without erythema, exudate or obvious calculi     NECK:   No neck masses or thyroid enlargement. Trachea midline. No tenderness to palpation    LYMPHATIC:   No cervical lymphadenopathy.     RESPIRATORY:   Symmetric chest elevation & no retractions. No significant hoarseness. No increased work of breathing.    CV:   No clubbing or cyanosis. No obvious edema    Skin:   No facial rashes, vesicles or lesions.     Extremities:   No gross abnormalities      Clinic Procedure    Binocular microscopy exam  Indication: tympanic membrane(s) could not be visualized adequately with handheld otoscopy.   Location:  bilateral ears  Visualization Instrument: A microscope was used to visualize through a speculum placed in the ear canal(s) to visualize the ear canal, tympanic membranes and to assist in assessment and removal of debris.  Findings:  see  physical exam documentation  Patient Status: The patient tolerated the procedure well.   Complications: There were no complications.     Information review:  External sources (notes, imaging, lab results) listed below personally reviewed to aid in medical decision making process.  -  -  -

## 2024-12-02 ENCOUNTER — APPOINTMENT (OUTPATIENT)
Dept: ENDOCRINOLOGY | Facility: CLINIC | Age: 74
End: 2024-12-02
Payer: MEDICARE

## 2024-12-04 ENCOUNTER — APPOINTMENT (OUTPATIENT)
Dept: PRIMARY CARE | Facility: CLINIC | Age: 74
End: 2024-12-04
Payer: MEDICARE

## 2024-12-04 VITALS
DIASTOLIC BLOOD PRESSURE: 80 MMHG | BODY MASS INDEX: 25.38 KG/M2 | SYSTOLIC BLOOD PRESSURE: 140 MMHG | WEIGHT: 192.4 LBS | TEMPERATURE: 96.7 F | HEART RATE: 58 BPM | OXYGEN SATURATION: 98 %

## 2024-12-04 DIAGNOSIS — I10 HYPERTENSION, UNSPECIFIED TYPE: ICD-10-CM

## 2024-12-04 DIAGNOSIS — E78.5 DYSLIPIDEMIA: ICD-10-CM

## 2024-12-04 DIAGNOSIS — E11.9 TYPE 2 DIABETES MELLITUS WITHOUT COMPLICATION, WITH LONG-TERM CURRENT USE OF INSULIN (MULTI): Primary | ICD-10-CM

## 2024-12-04 DIAGNOSIS — Z79.4 TYPE 2 DIABETES MELLITUS WITHOUT COMPLICATION, WITH LONG-TERM CURRENT USE OF INSULIN (MULTI): Primary | ICD-10-CM

## 2024-12-04 PROCEDURE — 1159F MED LIST DOCD IN RCRD: CPT | Performed by: INTERNAL MEDICINE

## 2024-12-04 PROCEDURE — 3079F DIAST BP 80-89 MM HG: CPT | Performed by: INTERNAL MEDICINE

## 2024-12-04 PROCEDURE — G0008 ADMIN INFLUENZA VIRUS VAC: HCPCS | Performed by: INTERNAL MEDICINE

## 2024-12-04 PROCEDURE — 90662 IIV NO PRSV INCREASED AG IM: CPT | Performed by: INTERNAL MEDICINE

## 2024-12-04 PROCEDURE — 99213 OFFICE O/P EST LOW 20 MIN: CPT | Performed by: INTERNAL MEDICINE

## 2024-12-04 PROCEDURE — 3077F SYST BP >= 140 MM HG: CPT | Performed by: INTERNAL MEDICINE

## 2024-12-04 NOTE — PATIENT INSTRUCTIONS
Start jardiance    Walk for 20-30 min on treadmill 3d per week     Blood work in one month     Flu shot today

## 2024-12-04 NOTE — PROGRESS NOTES
"Chief Complaint:   Follow-up ( Pt stopped  taking insulin)     History Of Present Illness:    Iglesia Pham is a 74 y.o. male with active medical problems outlined below who presents for follow up of diabetes.      Initial visit 5/16/24  Iglesia has hx of DM, HTN, HLD and enlarged prostate.  Last PCP left practice and needs new PCP and updated labs.  Recent ear wax removal by ENT, but now hearing muffled in both ears. ?Reaccumulation of ear wax.   Last A1C 12.0.  Was on farxiga but stopped due to frequent urination.  No other side effects. Has BPH and elevated PSA.  Bx on 2/11/22 and no PCa.    Continues to have frequent urination and nocturia 2-3x per night   Eye exam due - no sudden vision changes  Some nails are thickened and pitted.  This is chronic.        INTERVAL HISTORY 8/29/24  Iglesia has a history of poorly controlled diabetes.  His hemoglobin A1c was 12.6 on 5/16/2024.  He started a continuous glucose monitor and Lantus insulin at that time.  His blood sugar fluctuated significantly at the start of treatment, however they have been steady her over the past few weeks.  He had a low blood sugar of 50 sometime in July, but his blood sugar has not been less than 90 since then.  His blood sugar generally stays in the low 100s.  He will occasionally go up to 250 depending on what he eats.  Overall he is feeling well, and his mood and energy level are good.  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  He denies chest pain or shortness of breath with physical exertion.  He denies any change in bowel or bladder habits.        INTERVAL HISTORY 12/4/24  Iglesia was seen as a new patient in May.  A1C > 12 and CGM arranged.  Started on lantus.  He has struggled with insulin dosing and with low blood sugars.  Had 2 low bs reading of 42 after last visit and stopped insulin.   A1C down from 12.6 5/16 to 7.1 8/30  Trying to watch diet - blood sugar mostly \"in " "range\" on his monitor  Today  after toast   Working to limit salt and sugar in diet   Not much exercise - [facility] [employer] and thinking of going  back to the gym   Cologuard negative 2/22/24      Patient Active Problem List   Diagnosis    Bilateral tinnitus    Sensorineural hearing loss, bilateral    Diabetes (Multi)    Dyslipidemia    Hypertension    Diabetic neuropathy (Multi)    Vitamin D deficiency    Bilateral impacted cerumen    Elevated PSA    Erectile dysfunction of organic origin    Nuclear senile cataract of both eyes    Stage 3a chronic kidney disease (Multi)       Current Outpatient Medications:     amLODIPine (Norvasc) 5 mg tablet, Take 1 tablet (5 mg) by mouth once daily. as directed, Disp: 30 tablet, Rfl: 5    atorvastatin (Lipitor) 20 mg tablet, Take 1 tablet (20 mg) by mouth once daily at bedtime., Disp: 30 tablet, Rfl: 5    cholecalciferol (Vitamin D3) 25 MCG (1000 UT) capsule, Take 1 capsule (25 mcg) by mouth once daily., Disp: , Rfl:     fluocinolone (DermOtic) 0.01 % ear drops, Administer 5 drops into each ear 2 times a day., Disp: 20 mL, Rfl: 0    losartan-hydrochlorothiazide (Hyzaar) 50-12.5 mg tablet, Take 1 tablet by mouth once daily., Disp: 30 tablet, Rfl: 5    metFORMIN XR (Glucophage-XR) 750 mg 24 hr tablet, Take 1 tablet (750 mg) by mouth once daily in the evening. Take with meals., Disp: 30 tablet, Rfl: 5    tamsulosin (Flomax) 0.4 mg 24 hr capsule, Take 1 capsule (0.4 mg) by mouth once daily., Disp: 30 capsule, Rfl: 11    empagliflozin (Jardiance) 10 mg, Take 1 tablet (10 mg) by mouth once daily., Disp: 30 tablet, Rfl: 5    FreeStyle Aaron 3 [place] misc, Use as instructed, Disp: 1 each, Rfl: 3    FreeStyle Aaron 3 Sensor device, Apply new sensor every 14 days, Disp: 4 each, Rfl: 3    insulin glargine (Lantus Solostar U-100 Insulin) 100 unit/mL (3 mL) pen, Inject 20 Units under the skin once daily at bedtime. (Patient not taking: Reported on 12/4/2024), Disp: " 6 mL, Rfl: 3    triamcinolone (Kenalog) 0.1 % cream, Apply topically 2 times a day. Apply to affected area 1-2 times daily as needed., Disp: 15 g, Rfl: 0  Farxiga [dapagliflozin] and Lisinopril  Social History     Tobacco Use    Smoking status: Never    Smokeless tobacco: Never   Vaping Use    Vaping status: Never Used   Substance Use Topics    Alcohol use: Yes     Alcohol/week: 2.0 standard drinks of alcohol     Types: 1 Cans of beer, 1 Shots of liquor per week     Comment: occasionally    Drug use: Never         All pertinent aspects of medical and surgical history were reviewed and updated in chart    Review of Systems   Pertinent positives in review of systems outlined above.  Complete ROS otherwise negative.        Last Recorded Vitals:  No data found.         Physical Exam  HENT:      Mouth/Throat:      Pharynx: Oropharynx is clear.   Eyes:      Extraocular Movements: Extraocular movements intact.      Conjunctiva/sclera: Conjunctivae normal.      Pupils: Pupils are equal, round, and reactive to light.   Cardiovascular:      Rate and Rhythm: Normal rate and regular rhythm.      Pulses: Normal pulses.      Heart sounds: Normal heart sounds.   Pulmonary:      Effort: Pulmonary effort is normal.      Breath sounds: Normal breath sounds.   Musculoskeletal:      Right lower leg: No edema.      Left lower leg: No edema.      Comments: Foot exam - no skin breakdown, good pulses.             Assessment/Plan   Problem List Items Addressed This Visit       Diabetes (Multi) - Primary     A1C improved dramatically with the addition of lantus.  Low BS are concerning to Iglesia, and he prefers to stop insulin.  He will continue metformin, and he will add jardiance 10mg daily.  Will check FBS and A1C in 1mo.   To start walking on the treadmill for 20-30 min 3d per week          Relevant Medications    empagliflozin (Jardiance) 10 mg    Dyslipidemia     Lipids at target. Continue current medications.          Hypertension      BP elevated.  Exipect improvement with the addition  of SGLTi          Flu shot given today.    A total of 20 minutes was spent reviewing the chart and recent testing and discussing plan of care.         Wilfrido Sánchez MD

## 2024-12-05 NOTE — ASSESSMENT & PLAN NOTE
A1C improved dramatically with the addition of lantus.  Low BS are concerning to Iglesia, and he prefers to stop insulin.  He will continue metformin, and he will add jardiance 10mg daily.  Will check FBS and A1C in 1mo.   To start walking on the treadmill for 20-30 min 3d per week

## 2025-01-01 DIAGNOSIS — E11.9 TYPE 2 DIABETES MELLITUS WITHOUT COMPLICATION, WITHOUT LONG-TERM CURRENT USE OF INSULIN (MULTI): Primary | ICD-10-CM

## 2025-01-03 RX ORDER — BLOOD-GLUCOSE SENSOR
EACH MISCELLANEOUS
Qty: 4 EACH | Refills: 9 | Status: SHIPPED | OUTPATIENT
Start: 2025-01-03

## 2025-01-06 ENCOUNTER — APPOINTMENT (OUTPATIENT)
Dept: ENDOCRINOLOGY | Facility: CLINIC | Age: 75
End: 2025-01-06
Payer: MEDICARE

## 2025-01-08 PROBLEM — Z79.4 TYPE 2 DIABETES MELLITUS WITH HYPERGLYCEMIA, WITH LONG-TERM CURRENT USE OF INSULIN: Status: ACTIVE | Noted: 2023-09-17

## 2025-01-08 PROBLEM — E11.65 TYPE 2 DIABETES MELLITUS WITH HYPERGLYCEMIA, WITH LONG-TERM CURRENT USE OF INSULIN: Status: ACTIVE | Noted: 2023-09-17

## 2025-01-08 NOTE — ASSESSMENT & PLAN NOTE
-BP Goal <130/80.  -Continue amlodipine 5 mg daily.  -Continue losartan-hydrochlorothiazide 50-12.5 mg daily.  -Patient and I discussed dietary and lifestyle modifications, including DASH diet.  -Notable comorbidities: DM2, BPH.

## 2025-01-08 NOTE — PROGRESS NOTES
Subjective   Reason for Visit: Iglesia Pham is an 74 y.o. male here for follow-up of chronic conditions.     Past Medical, Surgical, and Family History reviewed and updated in chart.    Reviewed all medications by prescribing practitioner or clinical pharmacist (such as prescriptions, OTCs, herbal therapies and supplements) and documented in the medical record.    HPI    Initial visit 5/16/24  Iglesia has hx of DM, HTN, HLD and enlarged prostate.  Last PCP left practice and needs new PCP and updated labs.  Recent ear wax removal by ENT, but now hearing muffled in both ears. ?Reaccumulation of ear wax.   Last A1C 12.0.  Was on farxiga but stopped due to frequent urination.  No other side effects. Has BPH and elevated PSA.  Bx on 2/11/22 and no PCa.    Continues to have frequent urination and nocturia 2-3x per night   Eye exam due - no sudden vision changes  Some nails are thickened and pitted.  This is chronic.          INTERVAL HISTORY 8/29/24  Iglesia has a history of poorly controlled diabetes.  His hemoglobin A1c was 12.6 on 5/16/2024.  He started a continuous glucose monitor and Lantus insulin at that time.  His blood sugar fluctuated significantly at the start of treatment, however they have been steady her over the past few weeks.  He had a low blood sugar of 50 sometime in July, but his blood sugar has not been less than 90 since then.  His blood sugar generally stays in the low 100s.  He will occasionally go up to 250 depending on what he eats.  Overall he is feeling well, and his mood and energy level are good.  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  He denies chest pain or shortness of breath with physical exertion.  He denies any change in bowel or bladder habits.          INTERVAL HISTORY 12/4/24  Iglesia was seen as a new patient in May.  A1C > 12 and CGM arranged.  Started on lantus.  He has struggled with insulin dosing and with low  "blood sugars.  Had 2 low bs reading of 42 after last visit and stopped insulin.   A1C down from 12.6 5/16 to 7.1 8/30  Trying to watch diet - blood sugar mostly \"in range\" on his monitor  Today  after toast   Working to limit salt and sugar in diet   Not much exercise - has silver sneaker membership at the [employer] and thinking of going  back to the gym   Cologuard negative 2/22/24 1/9/25 Updates:  Patient denies chest pain/pressure/tightness, shortness of breath, orthopnea, paroxysmal nocturnal dyspnea, lower limb edema, blurred vision, lightheadedness/dizziness, presyncope, syncope.      Patient Care Team:  Yenni Jordan MD as PCP - General (Family Medicine)  Luis Peña MD as PCP - [employer] PCP     Review of Systems   Constitutional:  Negative for chills, fever and unexpected weight change.   HENT:  Negative for rhinorrhea.    Eyes:  Negative for pain.   Respiratory:  Negative for shortness of breath.    Cardiovascular:  Negative for chest pain.   Gastrointestinal:  Negative for abdominal pain.   Genitourinary:  Negative for hematuria.   Skin:  Negative for rash.   Neurological:  Negative for dizziness, syncope and light-headedness.   Psychiatric/Behavioral:  Negative for behavioral problems and suicidal ideas.        Objective   Vitals:  /70 (BP Location: Left arm, Patient Position: Sitting)   Pulse 63   Temp 36.4 °C (97.5 °F)   Ht 1.854 m (6' 1\")   Wt 89 kg (196 lb 4.8 oz)   SpO2 100%   BMI 25.90 kg/m²       Physical Exam  Vitals reviewed.   Constitutional:       General: He is not in acute distress.  HENT:      Head: Normocephalic.      Right Ear: External ear normal.      Left Ear: External ear normal.      Nose: No rhinorrhea.      Mouth/Throat:      Mouth: Mucous membranes are moist.   Eyes:      Conjunctiva/sclera: Conjunctivae normal.   Cardiovascular:      Rate and Rhythm: Normal rate and regular rhythm.      Heart sounds: No murmur heard.     No friction rub. No " gallop.   Pulmonary:      Effort: No respiratory distress.      Breath sounds: No wheezing, rhonchi or rales.   Abdominal:      General: Bowel sounds are normal. There is no distension.      Palpations: Abdomen is soft.      Tenderness: There is no abdominal tenderness.   Musculoskeletal:      Cervical back: Neck supple.      Right lower leg: No edema.      Left lower leg: No edema.   Skin:     General: Skin is warm and dry.      Capillary Refill: Capillary refill takes less than 2 seconds.   Neurological:      Mental Status: He is alert.      Gait: Gait normal.         Problem List Items Addressed This Visit       Type 2 diabetes mellitus with hyperglycemia, with long-term current use of insulin - Primary    Current Assessment & Plan     Patient's POC A1C went up to 9.2 on 1/9/25.   -Patient's Lantus was discontinued because of hypoglycemia, as indicated on FreeStyle Aaron (I reviewed data and saw at least five episodes of sugars in the 40s).   -Increase Jardiance from 10mg to 25mg daily on 1/9/25.  -Continue ARB.  -Continue Lipitor 20mg.            Relevant Medications    atorvastatin (Lipitor) 20 mg tablet    metFORMIN XR (Glucophage-XR) 750 mg 24 hr tablet    empagliflozin (Jardiance) 25 mg    Other Relevant Orders    POCT glycosylated hemoglobin (Hb A1C) manually resulted (Completed)    Dyslipidemia    Current Assessment & Plan     -LDL in range.  Continue statin therapy.  Will monitor for medication side effects.  -Notable comorbidities: DM2, hypertension, BPH.         Relevant Medications    atorvastatin (Lipitor) 20 mg tablet    Hypertension    Current Assessment & Plan     -BP Goal <130/80.  -Continue amlodipine 5 mg daily.  -Continue losartan-hydrochlorothiazide 50-12.5 mg daily.  -Patient and I discussed dietary and lifestyle modifications, including DASH diet.  -Notable comorbidities: DM2, BPH.         Relevant Medications    amLODIPine (Norvasc) 5 mg tablet    losartan-hydrochlorothiazide (Hyzaar)  50-12.5 mg tablet    Diabetic neuropathy (Multi)    Elevated PSA    Relevant Medications    tamsulosin (Flomax) 0.4 mg 24 hr capsule    Stage 3a chronic kidney disease (Multi)    Current Assessment & Plan     Comprehensive metabolic panel will be assessed now  -Continue Jardiance.  -Notable Co-Morbidities: HTN, DM2.            Relevant Medications    empagliflozin (Jardiance) 25 mg    Atrial fibrillation, unspecified type (Multi)    Current Assessment & Plan     -Stable. Patient is currently in sinus rhythm.          Relevant Medications    amLODIPine (Norvasc) 5 mg tablet           Medical decision making during this visit had more complexity inherent to evaluation and management associated with medical care services that serve as the continuing focal point for all needed health care services and/or with medical care services that are part of ongoing care related to a patient's single, serious condition or a complex condition.

## 2025-01-08 NOTE — ASSESSMENT & PLAN NOTE
-LDL in range.  Continue statin therapy.  Will monitor for medication side effects.  -Notable comorbidities: DM2, hypertension, BPH.

## 2025-01-08 NOTE — ASSESSMENT & PLAN NOTE
Comprehensive metabolic panel will be assessed now  -Continue Jardiance.  -Notable Co-Morbidities: HTN, DM2.

## 2025-01-09 ENCOUNTER — APPOINTMENT (OUTPATIENT)
Dept: PRIMARY CARE | Facility: CLINIC | Age: 75
End: 2025-01-09
Payer: MEDICARE

## 2025-01-09 VITALS
SYSTOLIC BLOOD PRESSURE: 112 MMHG | WEIGHT: 196.3 LBS | BODY MASS INDEX: 26.02 KG/M2 | DIASTOLIC BLOOD PRESSURE: 70 MMHG | TEMPERATURE: 97.5 F | HEIGHT: 73 IN | HEART RATE: 63 BPM | OXYGEN SATURATION: 100 %

## 2025-01-09 DIAGNOSIS — N18.31 STAGE 3A CHRONIC KIDNEY DISEASE (MULTI): ICD-10-CM

## 2025-01-09 DIAGNOSIS — I10 HYPERTENSION, UNSPECIFIED TYPE: ICD-10-CM

## 2025-01-09 DIAGNOSIS — Z79.4 TYPE 2 DIABETES MELLITUS WITH HYPERGLYCEMIA, WITH LONG-TERM CURRENT USE OF INSULIN: Primary | ICD-10-CM

## 2025-01-09 DIAGNOSIS — E11.65 TYPE 2 DIABETES MELLITUS WITH HYPERGLYCEMIA, WITH LONG-TERM CURRENT USE OF INSULIN: Primary | ICD-10-CM

## 2025-01-09 DIAGNOSIS — E78.5 DYSLIPIDEMIA: ICD-10-CM

## 2025-01-09 DIAGNOSIS — R97.20 ELEVATED PSA: ICD-10-CM

## 2025-01-09 DIAGNOSIS — I10 PRIMARY HYPERTENSION: ICD-10-CM

## 2025-01-09 DIAGNOSIS — I48.91 ATRIAL FIBRILLATION, UNSPECIFIED TYPE (MULTI): ICD-10-CM

## 2025-01-09 DIAGNOSIS — E11.43 DIABETIC AUTONOMIC NEUROPATHY ASSOCIATED WITH TYPE 2 DIABETES MELLITUS (MULTI): ICD-10-CM

## 2025-01-09 PROBLEM — E11.9 TYPE 2 DIABETES MELLITUS WITHOUT COMPLICATION, WITH LONG-TERM CURRENT USE OF INSULIN (MULTI): Status: ACTIVE | Noted: 2025-01-09

## 2025-01-09 LAB — POC HEMOGLOBIN A1C: 9.2 % (ref 4.2–6.5)

## 2025-01-09 PROCEDURE — 1160F RVW MEDS BY RX/DR IN RCRD: CPT | Performed by: STUDENT IN AN ORGANIZED HEALTH CARE EDUCATION/TRAINING PROGRAM

## 2025-01-09 PROCEDURE — 1036F TOBACCO NON-USER: CPT | Performed by: STUDENT IN AN ORGANIZED HEALTH CARE EDUCATION/TRAINING PROGRAM

## 2025-01-09 PROCEDURE — 3078F DIAST BP <80 MM HG: CPT | Performed by: STUDENT IN AN ORGANIZED HEALTH CARE EDUCATION/TRAINING PROGRAM

## 2025-01-09 PROCEDURE — 99214 OFFICE O/P EST MOD 30 MIN: CPT | Performed by: STUDENT IN AN ORGANIZED HEALTH CARE EDUCATION/TRAINING PROGRAM

## 2025-01-09 PROCEDURE — 1126F AMNT PAIN NOTED NONE PRSNT: CPT | Performed by: STUDENT IN AN ORGANIZED HEALTH CARE EDUCATION/TRAINING PROGRAM

## 2025-01-09 PROCEDURE — 3074F SYST BP LT 130 MM HG: CPT | Performed by: STUDENT IN AN ORGANIZED HEALTH CARE EDUCATION/TRAINING PROGRAM

## 2025-01-09 PROCEDURE — 1159F MED LIST DOCD IN RCRD: CPT | Performed by: STUDENT IN AN ORGANIZED HEALTH CARE EDUCATION/TRAINING PROGRAM

## 2025-01-09 PROCEDURE — 83036 HEMOGLOBIN GLYCOSYLATED A1C: CPT | Performed by: STUDENT IN AN ORGANIZED HEALTH CARE EDUCATION/TRAINING PROGRAM

## 2025-01-09 RX ORDER — ATORVASTATIN CALCIUM 20 MG/1
20 TABLET, FILM COATED ORAL NIGHTLY
Qty: 90 TABLET | Refills: 3 | Status: SHIPPED | OUTPATIENT
Start: 2025-01-09

## 2025-01-09 RX ORDER — INSULIN GLARGINE 100 [IU]/ML
20 INJECTION, SOLUTION SUBCUTANEOUS NIGHTLY
Qty: 6 ML | Refills: 11 | Status: SHIPPED | OUTPATIENT
Start: 2025-01-09 | End: 2025-01-09 | Stop reason: SINTOL

## 2025-01-09 RX ORDER — LOSARTAN POTASSIUM AND HYDROCHLOROTHIAZIDE 12.5; 5 MG/1; MG/1
1 TABLET ORAL DAILY
Qty: 90 TABLET | Refills: 3 | Status: SHIPPED | OUTPATIENT
Start: 2025-01-09

## 2025-01-09 RX ORDER — TAMSULOSIN HYDROCHLORIDE 0.4 MG/1
0.4 CAPSULE ORAL DAILY
Qty: 90 CAPSULE | Refills: 3 | Status: SHIPPED | OUTPATIENT
Start: 2025-01-09

## 2025-01-09 RX ORDER — METFORMIN HYDROCHLORIDE 750 MG/1
750 TABLET, EXTENDED RELEASE ORAL
Qty: 90 TABLET | Refills: 3 | Status: SHIPPED | OUTPATIENT
Start: 2025-01-09

## 2025-01-09 RX ORDER — AMLODIPINE BESYLATE 5 MG/1
5 TABLET ORAL DAILY
Qty: 90 TABLET | Refills: 3 | Status: SHIPPED | OUTPATIENT
Start: 2025-01-09

## 2025-01-09 ASSESSMENT — ENCOUNTER SYMPTOMS
FEVER: 0
DIZZINESS: 0
DEPRESSION: 0
LOSS OF SENSATION IN FEET: 0
ABDOMINAL PAIN: 0
RHINORRHEA: 0
HEMATURIA: 0
SHORTNESS OF BREATH: 0
CHILLS: 0
EYE PAIN: 0
OCCASIONAL FEELINGS OF UNSTEADINESS: 0
LIGHT-HEADEDNESS: 0
UNEXPECTED WEIGHT CHANGE: 0

## 2025-01-09 ASSESSMENT — PATIENT HEALTH QUESTIONNAIRE - PHQ9
1. LITTLE INTEREST OR PLEASURE IN DOING THINGS: NOT AT ALL
SUM OF ALL RESPONSES TO PHQ9 QUESTIONS 1 AND 2: 0
2. FEELING DOWN, DEPRESSED OR HOPELESS: NOT AT ALL

## 2025-01-09 ASSESSMENT — PAIN SCALES - GENERAL: PAINLEVEL_OUTOF10: 0-NO PAIN

## 2025-01-09 NOTE — ASSESSMENT & PLAN NOTE
Patient's POC A1C went up to 9.2 on 1/9/25.   -Patient's Lantus was discontinued because of hypoglycemia, as indicated on FreeStyle Aaron (I reviewed data and saw at least five episodes of sugars in the 40s).   -Increase Jardiance from 10mg to 25mg daily on 1/9/25.  -Continue ARB.  -Continue Lipitor 20mg.

## 2025-04-16 ASSESSMENT — ENCOUNTER SYMPTOMS
LIGHT-HEADEDNESS: 0
FEVER: 0
HEMATURIA: 0
CHILLS: 0
EYE PAIN: 0
ABDOMINAL PAIN: 0
SHORTNESS OF BREATH: 0
DIZZINESS: 0
RHINORRHEA: 0
UNEXPECTED WEIGHT CHANGE: 0

## 2025-04-16 NOTE — ASSESSMENT & PLAN NOTE
-  Lab Results   Component Value Date    HGBA1C 6.8 (A) 04/17/2025      -Patient's POC A1C went up to 9.2 on 1/9/25.   -Patient's Lantus was discontinued because of hypoglycemia, as indicated on FreeStyle Aaron (I reviewed data and saw at least five episodes of sugars in the 40s).   -Increased Jardiance from 10mg to 25mg daily on 1/9/25.  -Continue ARB.  -Continue Lipitor 20mg.

## 2025-04-16 NOTE — PROGRESS NOTES
Subjective   Reason for Visit: Iglesia Pham is an 75 y.o. male here for MAWV, and other chronic management    Past Medical, Surgical, and Family History reviewed and updated in chart.    Reviewed all medications by prescribing practitioner or clinical pharmacist (such as prescriptions, OTCs, herbal therapies and supplements) and documented in the medical record.    HPI    Initial visit 5/16/24  Iglesia has hx of DM, HTN, HLD and enlarged prostate.  Last PCP left practice and needs new PCP and updated labs.  Recent ear wax removal by ENT, but now hearing muffled in both ears. ?Reaccumulation of ear wax.   Last A1C 12.0.  Was on farxiga but stopped due to frequent urination.  No other side effects. Has BPH and elevated PSA.  Bx on 2/11/22 and no PCa.    Continues to have frequent urination and nocturia 2-3x per night   Eye exam due - no sudden vision changes  Some nails are thickened and pitted.  This is chronic.          INTERVAL HISTORY 8/29/24  Iglesia has a history of poorly controlled diabetes.  His hemoglobin A1c was 12.6 on 5/16/2024.  He started a continuous glucose monitor and Lantus insulin at that time.  His blood sugar fluctuated significantly at the start of treatment, however they have been steady her over the past few weeks.  He had a low blood sugar of 50 sometime in July, but his blood sugar has not been less than 90 since then.  His blood sugar generally stays in the low 100s.  He will occasionally go up to 250 depending on what he eats.  Overall he is feeling well, and his mood and energy level are good.  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  He denies chest pain or shortness of breath with physical exertion.  He denies any change in bowel or bladder habits.          INTERVAL HISTORY 12/4/24  Iglesia was seen as a new patient in May.  A1C > 12 and CGM arranged.  Started on lantus.  He has struggled with insulin dosing and with low  "blood sugars.  Had 2 low bs reading of 42 after last visit and stopped insulin.   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 He wants to get back at [employer].     Patient Care Team:  Yenni Jordan MD as PCP - General (Family Medicine)  Luis Peña MD as PCP - [employer] PCP     Review of Systems   Constitutional:  Negative for chills, fever and unexpected weight change.   HENT:  Negative for rhinorrhea.    Eyes:  Negative for pain.   Respiratory:  Negative for shortness of breath.    Cardiovascular:  Negative for chest pain.   Gastrointestinal:  Negative for abdominal pain.   Genitourinary:  Negative for hematuria.   Skin:  Negative for rash.   Neurological:  Negative for dizziness, syncope and light-headedness.   Psychiatric/Behavioral:  Negative for behavioral problems and suicidal ideas.        Objective   Vitals:  /79   Pulse 59   Temp 36.7 °C (98 °F) (Oral)   Wt 82.9 kg (182 lb 12.8 oz)   BMI 24.12 kg/m²       Physical Exam  Vitals reviewed.   Constitutional:       General: He is not in acute distress.  HENT:      Head: Normocephalic.      Right Ear: External ear normal.      Left Ear: External ear normal.      Nose: No rhinorrhea.      Mouth/Throat:      Mouth: Mucous membranes are moist.   Eyes:      Conjunctiva/sclera: Conjunctivae normal.   Cardiovascular:      Rate and Rhythm: Normal rate and regular rhythm.      Heart sounds: No murmur heard.     No friction rub. No gallop.   Pulmonary:      Effort: No respiratory distress.      Breath sounds: No wheezing, rhonchi or rales.   Abdominal:      General: Bowel sounds are " normal. There is no distension.      Palpations: Abdomen is soft.      Tenderness: There is no abdominal tenderness.   Musculoskeletal:      Cervical back: Neck supple.      Right lower leg: No edema.      Left lower leg: No edema.   Skin:     General: Skin is warm and dry.      Capillary Refill: Capillary refill takes less than 2 seconds.   Neurological:      Mental Status: He is alert.      Gait: Gait normal.         Problem List Items Addressed This Visit          Medium    Type 2 diabetes mellitus with hyperglycemia, with long-term current use of insulin    Current Assessment & Plan   -  Lab Results   Component Value Date    HGBA1C 6.8 (A) 04/17/2025      -Patient's POC A1C went up to 9.2 on 1/9/25.   -Patient's Lantus was discontinued because of hypoglycemia, as indicated on FreeStyle Aaron (I reviewed data and saw at least five episodes of sugars in the 40s).   -Increased Jardiance from 10mg to 25mg daily on 1/9/25.  -Continue ARB.  -Continue Lipitor 20mg.            Relevant Orders    POCT glycosylated hemoglobin (Hb A1C) manually resulted (Completed)    Dyslipidemia    Current Assessment & Plan   -LDL in range.  Continue statin therapy.  Will monitor for medication side effects.  -Notable comorbidities: DM2, hypertension, BPH.         Hypertension    Current Assessment & Plan   -BP Goal <130/80.  -Continue amlodipine 5 mg daily.  -Continue losartan-hydrochlorothiazide 50-12.5 mg daily.  -Patient and I discussed dietary and lifestyle modifications, including DASH diet.  -Notable comorbidities: DM2, BPH.         Diabetic neuropathy (Multi)    Stage 3a chronic kidney disease (Multi)    Current Assessment & Plan   Comprehensive metabolic panel will be assessed now  -Continue Jardiance.  -Notable Co-Morbidities: HTN, DM2.            Atrial fibrillation, unspecified type (Multi)    Current Assessment & Plan   -Stable. Patient is currently in sinus rhythm.           Other Visit Diagnoses         Routine general  medical examination at health care facility    -  Primary      Eczema, unspecified type        Relevant Medications    triamcinolone (Kenalog) 0.1 % cream                  Medical decision making during this visit had more complexity inherent to evaluation and management associated with medical care services that serve as the continuing focal point for all needed health care services and/or with medical care services that are part of ongoing care related to a patient's single, serious condition or a complex condition.

## 2025-04-17 ENCOUNTER — APPOINTMENT (OUTPATIENT)
Dept: PRIMARY CARE | Facility: CLINIC | Age: 75
End: 2025-04-17
Payer: MEDICARE

## 2025-04-17 VITALS
DIASTOLIC BLOOD PRESSURE: 79 MMHG | HEART RATE: 59 BPM | TEMPERATURE: 98 F | BODY MASS INDEX: 24.12 KG/M2 | SYSTOLIC BLOOD PRESSURE: 134 MMHG | WEIGHT: 182.8 LBS

## 2025-04-17 DIAGNOSIS — Z00.00 ROUTINE GENERAL MEDICAL EXAMINATION AT HEALTH CARE FACILITY: Primary | ICD-10-CM

## 2025-04-17 DIAGNOSIS — E78.5 DYSLIPIDEMIA: ICD-10-CM

## 2025-04-17 DIAGNOSIS — E11.42 DIABETIC POLYNEUROPATHY ASSOCIATED WITH TYPE 2 DIABETES MELLITUS: ICD-10-CM

## 2025-04-17 DIAGNOSIS — E11.65 TYPE 2 DIABETES MELLITUS WITH HYPERGLYCEMIA, WITH LONG-TERM CURRENT USE OF INSULIN: ICD-10-CM

## 2025-04-17 DIAGNOSIS — N18.31 STAGE 3A CHRONIC KIDNEY DISEASE (MULTI): ICD-10-CM

## 2025-04-17 DIAGNOSIS — I48.91 ATRIAL FIBRILLATION, UNSPECIFIED TYPE (MULTI): ICD-10-CM

## 2025-04-17 DIAGNOSIS — Z79.4 TYPE 2 DIABETES MELLITUS WITH HYPERGLYCEMIA, WITH LONG-TERM CURRENT USE OF INSULIN: ICD-10-CM

## 2025-04-17 DIAGNOSIS — I10 PRIMARY HYPERTENSION: ICD-10-CM

## 2025-04-17 DIAGNOSIS — L30.9 ECZEMA, UNSPECIFIED TYPE: ICD-10-CM

## 2025-04-17 LAB — POC HEMOGLOBIN A1C: 6.8 % (ref 4.2–6.5)

## 2025-04-17 PROCEDURE — 3078F DIAST BP <80 MM HG: CPT | Performed by: STUDENT IN AN ORGANIZED HEALTH CARE EDUCATION/TRAINING PROGRAM

## 2025-04-17 PROCEDURE — G0439 PPPS, SUBSEQ VISIT: HCPCS | Performed by: STUDENT IN AN ORGANIZED HEALTH CARE EDUCATION/TRAINING PROGRAM

## 2025-04-17 PROCEDURE — 1036F TOBACCO NON-USER: CPT | Performed by: STUDENT IN AN ORGANIZED HEALTH CARE EDUCATION/TRAINING PROGRAM

## 2025-04-17 PROCEDURE — 1123F ACP DISCUSS/DSCN MKR DOCD: CPT | Performed by: STUDENT IN AN ORGANIZED HEALTH CARE EDUCATION/TRAINING PROGRAM

## 2025-04-17 PROCEDURE — 83036 HEMOGLOBIN GLYCOSYLATED A1C: CPT | Performed by: STUDENT IN AN ORGANIZED HEALTH CARE EDUCATION/TRAINING PROGRAM

## 2025-04-17 PROCEDURE — 3075F SYST BP GE 130 - 139MM HG: CPT | Performed by: STUDENT IN AN ORGANIZED HEALTH CARE EDUCATION/TRAINING PROGRAM

## 2025-04-17 PROCEDURE — 1160F RVW MEDS BY RX/DR IN RCRD: CPT | Performed by: STUDENT IN AN ORGANIZED HEALTH CARE EDUCATION/TRAINING PROGRAM

## 2025-04-17 PROCEDURE — 1170F FXNL STATUS ASSESSED: CPT | Performed by: STUDENT IN AN ORGANIZED HEALTH CARE EDUCATION/TRAINING PROGRAM

## 2025-04-17 PROCEDURE — 99214 OFFICE O/P EST MOD 30 MIN: CPT | Performed by: STUDENT IN AN ORGANIZED HEALTH CARE EDUCATION/TRAINING PROGRAM

## 2025-04-17 PROCEDURE — 1158F ADVNC CARE PLAN TLK DOCD: CPT | Performed by: STUDENT IN AN ORGANIZED HEALTH CARE EDUCATION/TRAINING PROGRAM

## 2025-04-17 PROCEDURE — 1159F MED LIST DOCD IN RCRD: CPT | Performed by: STUDENT IN AN ORGANIZED HEALTH CARE EDUCATION/TRAINING PROGRAM

## 2025-04-17 PROCEDURE — G2211 COMPLEX E/M VISIT ADD ON: HCPCS | Performed by: STUDENT IN AN ORGANIZED HEALTH CARE EDUCATION/TRAINING PROGRAM

## 2025-04-17 RX ORDER — TRIAMCINOLONE ACETONIDE 1 MG/G
CREAM TOPICAL
Qty: 15 G | Refills: 11 | Status: SHIPPED | OUTPATIENT
Start: 2025-04-17

## 2025-04-17 ASSESSMENT — ACTIVITIES OF DAILY LIVING (ADL)
MANAGING_FINANCES: INDEPENDENT
DOING_HOUSEWORK: INDEPENDENT
GROCERY_SHOPPING: INDEPENDENT
TAKING_MEDICATION: INDEPENDENT
DRESSING: INDEPENDENT
BATHING: INDEPENDENT

## 2025-04-17 ASSESSMENT — ENCOUNTER SYMPTOMS
DEPRESSION: 0
LOSS OF SENSATION IN FEET: 0
OCCASIONAL FEELINGS OF UNSTEADINESS: 0

## 2025-05-13 ENCOUNTER — TELEPHONE (OUTPATIENT)
Dept: PHARMACY | Facility: HOSPITAL | Age: 75
End: 2025-05-13
Payer: MEDICARE

## 2025-05-13 NOTE — TELEPHONE ENCOUNTER
Population Health: Outreach by Ambulatory Pharmacy Team    Patient: Iglesia Pham  Primary Care Provider (PCP): Yenni Jordan MD  Payor: [facility]  Reason: Adherence  Medication(s): Metformin XR 750mg, Jardiance 25mg   Outcome: Left Cameron Reza, PharmD  PGY-1 Pharmacy Resident

## 2025-05-20 ENCOUNTER — TELEPHONE (OUTPATIENT)
Dept: PHARMACY | Facility: HOSPITAL | Age: 75
End: 2025-05-20
Payer: MEDICARE

## 2025-05-20 NOTE — TELEPHONE ENCOUNTER
Population Health: Outreach by Ambulatory Pharmacy Team    Patient: Iglesia Pham  Primary Care Provider (PCP): Yenni Jordan MD  Payor: [facility]  Reason: Adherence  Medication(s): Metformin XR 750mg, Jardiance 25mg   Outcome: No Answer/Invalid Number/Voicemail Box Full    Norma Reza, PharmD  PGY-1 Pharmacy Resident

## 2025-07-18 ENCOUNTER — APPOINTMENT (OUTPATIENT)
Dept: PRIMARY CARE | Facility: CLINIC | Age: 75
End: 2025-07-18
Payer: MEDICARE

## 2025-07-18 VITALS
DIASTOLIC BLOOD PRESSURE: 79 MMHG | OXYGEN SATURATION: 97 % | TEMPERATURE: 98.2 F | SYSTOLIC BLOOD PRESSURE: 138 MMHG | HEIGHT: 73 IN | BODY MASS INDEX: 24.04 KG/M2 | HEART RATE: 61 BPM | WEIGHT: 181.4 LBS

## 2025-07-18 DIAGNOSIS — Z79.4 TYPE 2 DIABETES MELLITUS WITH HYPERGLYCEMIA, WITH LONG-TERM CURRENT USE OF INSULIN: Primary | ICD-10-CM

## 2025-07-18 DIAGNOSIS — I10 PRIMARY HYPERTENSION: ICD-10-CM

## 2025-07-18 DIAGNOSIS — E55.9 VITAMIN D DEFICIENCY: ICD-10-CM

## 2025-07-18 DIAGNOSIS — H90.3 SENSORINEURAL HEARING LOSS, BILATERAL: ICD-10-CM

## 2025-07-18 DIAGNOSIS — E11.65 TYPE 2 DIABETES MELLITUS WITH HYPERGLYCEMIA, WITH LONG-TERM CURRENT USE OF INSULIN: Primary | ICD-10-CM

## 2025-07-18 DIAGNOSIS — E78.5 DYSLIPIDEMIA: ICD-10-CM

## 2025-07-18 DIAGNOSIS — N18.31 STAGE 3A CHRONIC KIDNEY DISEASE (MULTI): ICD-10-CM

## 2025-07-18 DIAGNOSIS — I48.91 ATRIAL FIBRILLATION, UNSPECIFIED TYPE (MULTI): ICD-10-CM

## 2025-07-18 PROBLEM — H93.13 BILATERAL TINNITUS: Status: RESOLVED | Noted: 2021-10-26 | Resolved: 2025-07-18

## 2025-07-18 PROBLEM — E11.40 DIABETIC NEUROPATHY (MULTI): Status: RESOLVED | Noted: 2023-09-17 | Resolved: 2025-07-18

## 2025-07-18 PROBLEM — H61.23 BILATERAL IMPACTED CERUMEN: Status: RESOLVED | Noted: 2021-10-26 | Resolved: 2025-07-18

## 2025-07-18 PROBLEM — H25.13 NUCLEAR SENILE CATARACT OF BOTH EYES: Status: RESOLVED | Noted: 2019-02-05 | Resolved: 2025-07-18

## 2025-07-18 LAB — POC HEMOGLOBIN A1C: 7.3 % (ref 4.2–6.5)

## 2025-07-18 PROCEDURE — 83036 HEMOGLOBIN GLYCOSYLATED A1C: CPT | Performed by: STUDENT IN AN ORGANIZED HEALTH CARE EDUCATION/TRAINING PROGRAM

## 2025-07-18 PROCEDURE — 99214 OFFICE O/P EST MOD 30 MIN: CPT | Performed by: STUDENT IN AN ORGANIZED HEALTH CARE EDUCATION/TRAINING PROGRAM

## 2025-07-18 PROCEDURE — G2211 COMPLEX E/M VISIT ADD ON: HCPCS | Performed by: STUDENT IN AN ORGANIZED HEALTH CARE EDUCATION/TRAINING PROGRAM

## 2025-07-18 PROCEDURE — 3078F DIAST BP <80 MM HG: CPT | Performed by: STUDENT IN AN ORGANIZED HEALTH CARE EDUCATION/TRAINING PROGRAM

## 2025-07-18 PROCEDURE — 3075F SYST BP GE 130 - 139MM HG: CPT | Performed by: STUDENT IN AN ORGANIZED HEALTH CARE EDUCATION/TRAINING PROGRAM

## 2025-07-18 PROCEDURE — 1036F TOBACCO NON-USER: CPT | Performed by: STUDENT IN AN ORGANIZED HEALTH CARE EDUCATION/TRAINING PROGRAM

## 2025-07-18 PROCEDURE — 1159F MED LIST DOCD IN RCRD: CPT | Performed by: STUDENT IN AN ORGANIZED HEALTH CARE EDUCATION/TRAINING PROGRAM

## 2025-07-18 ASSESSMENT — PATIENT HEALTH QUESTIONNAIRE - PHQ9
SUM OF ALL RESPONSES TO PHQ9 QUESTIONS 1 AND 2: 0
2. FEELING DOWN, DEPRESSED OR HOPELESS: NOT AT ALL
2. FEELING DOWN, DEPRESSED OR HOPELESS: NOT AT ALL
1. LITTLE INTEREST OR PLEASURE IN DOING THINGS: NOT AT ALL
1. LITTLE INTEREST OR PLEASURE IN DOING THINGS: NOT AT ALL
SUM OF ALL RESPONSES TO PHQ9 QUESTIONS 1 AND 2: 0

## 2025-07-18 ASSESSMENT — ENCOUNTER SYMPTOMS
RHINORRHEA: 0
LIGHT-HEADEDNESS: 0
UNEXPECTED WEIGHT CHANGE: 0
EYE PAIN: 0
OCCASIONAL FEELINGS OF UNSTEADINESS: 0
ABDOMINAL PAIN: 0
DIZZINESS: 0
HEMATURIA: 0
SHORTNESS OF BREATH: 0
FEVER: 0
CHILLS: 0
LOSS OF SENSATION IN FEET: 0
DEPRESSION: 0

## 2025-07-18 NOTE — PROGRESS NOTES
Subjective   Reason for Visit: Iglesia Pham is an 75 y.o. male          Reviewed all medications by prescribing practitioner or clinical pharmacist (such as prescriptions, OTCs, herbal therapies and supplements) and documented in the medical record.    HPI    Initial visit 5/16/24  Iglesia has hx of DM, HTN, HLD and enlarged prostate.  Last PCP left practice and needs new PCP and updated labs.  Recent ear wax removal by ENT, but now hearing muffled in both ears. ?Reaccumulation of ear wax.   Last A1C 12.0.  Was on farxiga but stopped due to frequent urination.  No other side effects. Has BPH and elevated PSA.  Bx on 2/11/22 and no PCa.    Continues to have frequent urination and nocturia 2-3x per night   Eye exam due - no sudden vision changes  Some nails are thickened and pitted.  This is chronic.          INTERVAL HISTORY 8/29/24  Iglesia has a history of poorly controlled diabetes.  His hemoglobin A1c was 12.6 on 5/16/2024.  He started a continuous glucose monitor and Lantus insulin at that time.  His blood sugar fluctuated significantly at the start of treatment, however they have been steady her over the past few weeks.  He had a low blood sugar of 50 sometime in July, but his blood sugar has not been less than 90 since then.  His blood sugar generally stays in the low 100s.  He will occasionally go up to 250 depending on what he eats.  Overall he is feeling well, and his mood and energy level are good.  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  He denies chest pain or shortness of breath with physical exertion.  He denies any change in bowel or bladder habits.          INTERVAL HISTORY 12/4/24  Iglesia was seen as a new patient in May.  A1C > 12 and CGM arranged.  Started on lantus.  He has struggled with insulin dosing and with low blood sugars.  Had 2 low bs reading of 42 after last visit and stopped insulin.   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 He wants to get back at [employer].     Interval History 7/18/2025:  Patient is here for follow-up. I reviewed CGM data. No concerns for hypoglycemic episodes upon review of reader.     Patient Care Team:  Yenni Jordan MD as PCP - General (Family Medicine)  Luis Peña MD as PCP - [employer] PCP     Review of Systems   Constitutional:  Negative for chills, fever and unexpected weight change.   HENT:  Negative for rhinorrhea.    Eyes:  Negative for pain.   Respiratory:  Negative for shortness of breath.    Cardiovascular:  Negative for chest pain.   Gastrointestinal:  Negative for abdominal pain.   Genitourinary:  Negative for hematuria.   Skin:  Negative for rash.   Neurological:  Negative for dizziness, syncope and light-headedness.   Psychiatric/Behavioral:  Negative for behavioral problems and suicidal ideas.        Objective   Vitals:  /79 (BP Location: Left arm, Patient Position: Sitting, BP Cuff Size: Adult)   Pulse 61   Temp 36.8 °C (98.2 °F) (Temporal)   Ht 1.854 m (6' 1\")   Wt 82.3 kg (181 lb 6.4 oz)   SpO2 97%   BMI 23.93 kg/m²       Physical Exam  Vitals reviewed.   Constitutional:       General: He is not in acute distress.  HENT:      Head: Normocephalic.      Right Ear: External ear normal.      Left Ear: External ear normal.      Nose: No rhinorrhea.      Mouth/Throat:      Mouth: Mucous membranes are moist.     Eyes:      Conjunctiva/sclera: Conjunctivae normal.       Cardiovascular:      Rate and Rhythm: Normal rate and regular rhythm.      Heart sounds: No murmur heard.     No friction rub. No gallop. "   Pulmonary:      Effort: No respiratory distress.      Breath sounds: No wheezing, rhonchi or rales.   Abdominal:      General: Bowel sounds are normal. There is no distension.      Palpations: Abdomen is soft.      Tenderness: There is no abdominal tenderness.     Musculoskeletal:      Cervical back: Neck supple.      Right lower leg: No edema.      Left lower leg: No edema.     Skin:     General: Skin is warm and dry.      Capillary Refill: Capillary refill takes less than 2 seconds.     Neurological:      Mental Status: He is alert.      Gait: Gait normal.         Problem List Items Addressed This Visit          Medium    RESOLVED: Sensorineural hearing loss, bilateral    Type 2 diabetes mellitus with hyperglycemia, with long-term current use of insulin - Primary    Current Assessment & Plan   -  Lab Results   Component Value Date    HGBA1C 6.8 (A) 04/17/2025      -POC A1C 7.3 on  7/18/2025.  -Patient's POC A1C went up to 9.2 on 1/9/25.   -Patient's Lantus was discontinued because of hypoglycemia, as indicated on FreeStyle Aaron (I reviewed data and saw at least five episodes of sugars in the 40s).   -Increased Jardiance from 10mg to 25mg daily on 1/9/25.  -Continue Metformin 750mg nightly. Patient could not tolerate higher dosages.   -Continue ARB.  -Continue Lipitor 20mg.            Relevant Orders    POCT glycosylated hemoglobin (Hb A1C) manually resulted (Completed)    Albumin-Creatinine Ratio, Urine Random    Comprehensive metabolic panel    Dyslipidemia    Current Assessment & Plan   -LDL in range.  Continue statin therapy.  Will monitor for medication side effects.  -Notable comorbidities: DM2, hypertension, BPH.         Hypertension    Current Assessment & Plan   -BP Goal <130/80.  -Continue amlodipine 5 mg daily.  -Continue losartan-hydrochlorothiazide 50-12.5 mg daily.  -Patient and I discussed dietary and lifestyle modifications, including DASH diet.  -Notable comorbidities: DM2, BPH.         Vitamin  D deficiency    Current Assessment & Plan   -Within goal.  Continue current vitamin D supplementation.  Will continue to monitor vitamin D levels.         Stage 3a chronic kidney disease (Multi)    Current Assessment & Plan   Comprehensive metabolic panel will be assessed now  -Continue Jardiance.  -Notable Co-Morbidities: HTN, DM2.            Atrial fibrillation, unspecified type (Multi)    Current Assessment & Plan   -Stable. Patient is currently in sinus rhythm.                   Medical decision making during this visit had more complexity inherent to evaluation and management associated with medical care services that serve as the continuing focal point for all needed health care services and/or with medical care services that are part of ongoing care related to a patient's single, serious condition or a complex condition.

## 2025-07-18 NOTE — ASSESSMENT & PLAN NOTE
-  Lab Results   Component Value Date    HGBA1C 6.8 (A) 04/17/2025      -POC A1C 7.3 on  7/18/2025.  -Patient's POC A1C went up to 9.2 on 1/9/25.   -Patient's Lantus was discontinued because of hypoglycemia, as indicated on FreeStyle Aaron (I reviewed data and saw at least five episodes of sugars in the 40s).   -Increased Jardiance from 10mg to 25mg daily on 1/9/25.  -Continue Metformin 750mg nightly. Patient could not tolerate higher dosages.   -Continue ARB.  -Continue Lipitor 20mg.

## 2025-07-18 NOTE — ASSESSMENT & PLAN NOTE
-Within goal.  Continue current vitamin D supplementation.  Will continue to monitor vitamin D levels.

## 2025-07-19 LAB
ALBUMIN SERPL-MCNC: 4.4 G/DL (ref 3.6–5.1)
ALBUMIN/CREAT UR: 52 MG/G CREAT
ALP SERPL-CCNC: 42 U/L (ref 35–144)
ALT SERPL-CCNC: 12 U/L (ref 9–46)
ANION GAP SERPL CALCULATED.4IONS-SCNC: 8 MMOL/L (CALC) (ref 7–17)
AST SERPL-CCNC: 16 U/L (ref 10–35)
BILIRUB SERPL-MCNC: 0.9 MG/DL (ref 0.2–1.2)
BUN SERPL-MCNC: 23 MG/DL (ref 7–25)
CALCIUM SERPL-MCNC: 9.2 MG/DL (ref 8.6–10.3)
CHLORIDE SERPL-SCNC: 106 MMOL/L (ref 98–110)
CO2 SERPL-SCNC: 28 MMOL/L (ref 20–32)
CREAT SERPL-MCNC: 1.55 MG/DL (ref 0.7–1.28)
CREAT UR-MCNC: 77 MG/DL (ref 20–320)
EGFRCR SERPLBLD CKD-EPI 2021: 46 ML/MIN/1.73M2
GLUCOSE SERPL-MCNC: 130 MG/DL (ref 65–99)
MICROALBUMIN UR-MCNC: 4 MG/DL
POTASSIUM SERPL-SCNC: 4.3 MMOL/L (ref 3.5–5.3)
PROT SERPL-MCNC: 7.3 G/DL (ref 6.1–8.1)
SODIUM SERPL-SCNC: 142 MMOL/L (ref 135–146)

## 2025-10-21 ENCOUNTER — APPOINTMENT (OUTPATIENT)
Dept: PRIMARY CARE | Facility: CLINIC | Age: 75
End: 2025-10-21
Payer: MEDICARE

## 2026-01-22 ENCOUNTER — APPOINTMENT (OUTPATIENT)
Dept: PRIMARY CARE | Facility: CLINIC | Age: 76
End: 2026-01-22
Payer: MEDICARE

## 2026-04-23 ENCOUNTER — APPOINTMENT (OUTPATIENT)
Dept: PRIMARY CARE | Facility: CLINIC | Age: 76
End: 2026-04-23
Payer: MEDICARE